# Patient Record
Sex: FEMALE | Race: WHITE | NOT HISPANIC OR LATINO | Employment: OTHER | URBAN - METROPOLITAN AREA
[De-identification: names, ages, dates, MRNs, and addresses within clinical notes are randomized per-mention and may not be internally consistent; named-entity substitution may affect disease eponyms.]

---

## 2024-05-15 ENCOUNTER — APPOINTMENT (OUTPATIENT)
Dept: CARDIOLOGY | Facility: HOSPITAL | Age: 64
End: 2024-05-15
Attending: INTERNAL MEDICINE
Payer: COMMERCIAL

## 2024-05-15 ENCOUNTER — APPOINTMENT (OUTPATIENT)
Dept: NUCLEAR MEDICINE | Facility: HOSPITAL | Age: 64
End: 2024-05-15
Attending: INTERNAL MEDICINE
Payer: COMMERCIAL

## 2024-05-15 ENCOUNTER — APPOINTMENT (OUTPATIENT)
Dept: RADIOLOGY | Facility: HOSPITAL | Age: 64
End: 2024-05-15
Attending: EMERGENCY MEDICINE
Payer: COMMERCIAL

## 2024-05-15 ENCOUNTER — HOSPITAL ENCOUNTER (OUTPATIENT)
Facility: HOSPITAL | Age: 64
Setting detail: OBSERVATION
Discharge: HOME | End: 2024-05-16
Attending: EMERGENCY MEDICINE | Admitting: INTERNAL MEDICINE
Payer: COMMERCIAL

## 2024-05-15 ENCOUNTER — APPOINTMENT (OUTPATIENT)
Dept: OCCUPATIONAL THERAPY | Facility: HOSPITAL | Age: 64
End: 2024-05-15
Attending: INTERNAL MEDICINE
Payer: COMMERCIAL

## 2024-05-15 DIAGNOSIS — R07.89 OTHER CHEST PAIN: ICD-10-CM

## 2024-05-15 DIAGNOSIS — I42.9 CARDIOMYOPATHY, UNSPECIFIED TYPE (H): ICD-10-CM

## 2024-05-15 DIAGNOSIS — R07.9 CHEST PAIN, UNSPECIFIED TYPE: ICD-10-CM

## 2024-05-15 DIAGNOSIS — R94.39 ABNORMAL CARDIOVASCULAR STRESS TEST: Primary | ICD-10-CM

## 2024-05-15 PROBLEM — E66.01 MORBID OBESITY (H): Status: ACTIVE | Noted: 2024-05-15

## 2024-05-15 PROBLEM — E10.9 DIABETES MELLITUS TYPE 1 (H): Status: ACTIVE | Noted: 2024-05-15

## 2024-05-15 LAB
ABO/RH(D): NORMAL
ANION GAP SERPL CALCULATED.3IONS-SCNC: 11 MMOL/L (ref 7–15)
ANTIBODY SCREEN: NEGATIVE
BASOPHILS # BLD AUTO: 0 10E3/UL (ref 0–0.2)
BASOPHILS NFR BLD AUTO: 0 %
BUN SERPL-MCNC: 19.6 MG/DL (ref 8–23)
CALCIUM SERPL-MCNC: 9.6 MG/DL (ref 8.8–10.2)
CHLORIDE SERPL-SCNC: 105 MMOL/L (ref 98–107)
CREAT SERPL-MCNC: 1.08 MG/DL (ref 0.51–0.95)
CV STRESS CURRENT BP HE: NORMAL
CV STRESS CURRENT HR HE: 63
CV STRESS CURRENT HR HE: 65
CV STRESS CURRENT HR HE: 67
CV STRESS CURRENT HR HE: 71
CV STRESS CURRENT HR HE: 72
CV STRESS CURRENT HR HE: 72
CV STRESS CURRENT HR HE: 73
CV STRESS CURRENT HR HE: 74
CV STRESS CURRENT HR HE: 75
CV STRESS CURRENT HR HE: 76
CV STRESS CURRENT HR HE: 79
CV STRESS CURRENT HR HE: 81
CV STRESS CURRENT HR HE: 86
CV STRESS DEVIATION TIME HE: NORMAL
CV STRESS ECHO PERCENT HR HE: NORMAL
CV STRESS EXERCISE STAGE HE: NORMAL
CV STRESS FINAL RESTING BP HE: NORMAL
CV STRESS FINAL RESTING HR HE: 71
CV STRESS MAX HR HE: 86
CV STRESS MAX TREADMILL GRADE HE: 0
CV STRESS MAX TREADMILL SPEED HE: 0
CV STRESS PEAK DIA BP HE: NORMAL
CV STRESS PEAK SYS BP HE: NORMAL
CV STRESS PHASE HE: NORMAL
CV STRESS PROTOCOL HE: NORMAL
CV STRESS RESTING PT POSITION HE: NORMAL
CV STRESS RESTING PT POSITION HE: NORMAL
CV STRESS ST DEVIATION AMOUNT HE: NORMAL
CV STRESS ST DEVIATION ELEVATION HE: NORMAL
CV STRESS ST EVELATION AMOUNT HE: NORMAL
CV STRESS TEST TYPE HE: NORMAL
CV STRESS TOTAL STAGE TIME MIN 1 HE: NORMAL
DEPRECATED HCO3 PLAS-SCNC: 24 MMOL/L (ref 22–29)
EGFRCR SERPLBLD CKD-EPI 2021: 57 ML/MIN/1.73M2
EOSINOPHIL # BLD AUTO: 0.2 10E3/UL (ref 0–0.7)
EOSINOPHIL NFR BLD AUTO: 2 %
ERYTHROCYTE [DISTWIDTH] IN BLOOD BY AUTOMATED COUNT: 14.4 % (ref 10–15)
GLUCOSE BLDC GLUCOMTR-MCNC: 100 MG/DL (ref 70–99)
GLUCOSE BLDC GLUCOMTR-MCNC: 116 MG/DL (ref 70–99)
GLUCOSE BLDC GLUCOMTR-MCNC: 122 MG/DL (ref 70–99)
GLUCOSE BLDC GLUCOMTR-MCNC: 127 MG/DL (ref 70–99)
GLUCOSE BLDC GLUCOMTR-MCNC: 147 MG/DL (ref 70–99)
GLUCOSE SERPL-MCNC: 151 MG/DL (ref 70–99)
HBA1C MFR BLD: 7.8 %
HCT VFR BLD AUTO: 41.6 % (ref 35–47)
HGB BLD-MCNC: 13.4 G/DL (ref 11.7–15.7)
IMM GRANULOCYTES # BLD: 0 10E3/UL
IMM GRANULOCYTES NFR BLD: 0 %
LYMPHOCYTES # BLD AUTO: 2.3 10E3/UL (ref 0.8–5.3)
LYMPHOCYTES NFR BLD AUTO: 33 %
MCH RBC QN AUTO: 29.4 PG (ref 26.5–33)
MCHC RBC AUTO-ENTMCNC: 32.2 G/DL (ref 31.5–36.5)
MCV RBC AUTO: 91 FL (ref 78–100)
MONOCYTES # BLD AUTO: 0.4 10E3/UL (ref 0–1.3)
MONOCYTES NFR BLD AUTO: 6 %
NEUTROPHILS # BLD AUTO: 4.1 10E3/UL (ref 1.6–8.3)
NEUTROPHILS NFR BLD AUTO: 59 %
NRBC # BLD AUTO: 0 10E3/UL
NRBC BLD AUTO-RTO: 0 /100
NT-PROBNP SERPL-MCNC: 160 PG/ML (ref 0–900)
PLATELET # BLD AUTO: 226 10E3/UL (ref 150–450)
POTASSIUM SERPL-SCNC: 4.1 MMOL/L (ref 3.4–5.3)
RATE PRESSURE PRODUCT: NORMAL
RBC # BLD AUTO: 4.56 10E6/UL (ref 3.8–5.2)
SODIUM SERPL-SCNC: 140 MMOL/L (ref 135–145)
SPECIMEN EXPIRATION DATE: NORMAL
STRESS ECHO BASELINE DIASTOLIC HE: 70
STRESS ECHO BASELINE HR: 62
STRESS ECHO BASELINE SYSTOLIC BP: 127
STRESS ECHO CALCULATED PERCENT HR: 55 %
STRESS ECHO LAST STRESS DIASTOLIC BP: 61
STRESS ECHO LAST STRESS HR: 76
STRESS ECHO LAST STRESS SYSTOLIC BP: 129
STRESS ECHO TARGET HR: 156
STRESS/REST PERFUSION RATIO: 1.32
TROPONIN T SERPL HS-MCNC: 6 NG/L
TROPONIN T SERPL HS-MCNC: 6 NG/L
TROPONIN T SERPL HS-MCNC: 7 NG/L
WBC # BLD AUTO: 7 10E3/UL (ref 4–11)

## 2024-05-15 PROCEDURE — 97535 SELF CARE MNGMENT TRAINING: CPT | Mod: GO

## 2024-05-15 PROCEDURE — 93306 TTE W/DOPPLER COMPLETE: CPT | Mod: 26 | Performed by: INTERNAL MEDICINE

## 2024-05-15 PROCEDURE — 96375 TX/PRO/DX INJ NEW DRUG ADDON: CPT

## 2024-05-15 PROCEDURE — 97165 OT EVAL LOW COMPLEX 30 MIN: CPT | Mod: GO

## 2024-05-15 PROCEDURE — 250N000011 HC RX IP 250 OP 636: Performed by: INTERNAL MEDICINE

## 2024-05-15 PROCEDURE — 343N000001 HC RX 343: Performed by: INTERNAL MEDICINE

## 2024-05-15 PROCEDURE — C8929 TTE W OR WO FOL WCON,DOPPLER: HCPCS

## 2024-05-15 PROCEDURE — 96376 TX/PRO/DX INJ SAME DRUG ADON: CPT

## 2024-05-15 PROCEDURE — 96372 THER/PROPH/DIAG INJ SC/IM: CPT | Mod: 59 | Performed by: INTERNAL MEDICINE

## 2024-05-15 PROCEDURE — G0378 HOSPITAL OBSERVATION PER HR: HCPCS

## 2024-05-15 PROCEDURE — 83880 ASSAY OF NATRIURETIC PEPTIDE: CPT | Performed by: EMERGENCY MEDICINE

## 2024-05-15 PROCEDURE — 78452 HT MUSCLE IMAGE SPECT MULT: CPT

## 2024-05-15 PROCEDURE — 255N000002 HC RX 255 OP 636: Performed by: INTERNAL MEDICINE

## 2024-05-15 PROCEDURE — 99285 EMERGENCY DEPT VISIT HI MDM: CPT | Mod: 25

## 2024-05-15 PROCEDURE — 36415 COLL VENOUS BLD VENIPUNCTURE: CPT | Performed by: INTERNAL MEDICINE

## 2024-05-15 PROCEDURE — 84484 ASSAY OF TROPONIN QUANT: CPT | Performed by: EMERGENCY MEDICINE

## 2024-05-15 PROCEDURE — 99222 1ST HOSP IP/OBS MODERATE 55: CPT | Performed by: INTERNAL MEDICINE

## 2024-05-15 PROCEDURE — 71045 X-RAY EXAM CHEST 1 VIEW: CPT

## 2024-05-15 PROCEDURE — 78452 HT MUSCLE IMAGE SPECT MULT: CPT | Mod: 26 | Performed by: STUDENT IN AN ORGANIZED HEALTH CARE EDUCATION/TRAINING PROGRAM

## 2024-05-15 PROCEDURE — 250N000012 HC RX MED GY IP 250 OP 636 PS 637: Performed by: INTERNAL MEDICINE

## 2024-05-15 PROCEDURE — 82962 GLUCOSE BLOOD TEST: CPT

## 2024-05-15 PROCEDURE — 93017 CV STRESS TEST TRACING ONLY: CPT

## 2024-05-15 PROCEDURE — 250N000013 HC RX MED GY IP 250 OP 250 PS 637: Performed by: INTERNAL MEDICINE

## 2024-05-15 PROCEDURE — 93005 ELECTROCARDIOGRAM TRACING: CPT | Mod: 59 | Performed by: EMERGENCY MEDICINE

## 2024-05-15 PROCEDURE — 83036 HEMOGLOBIN GLYCOSYLATED A1C: CPT | Performed by: INTERNAL MEDICINE

## 2024-05-15 PROCEDURE — 36415 COLL VENOUS BLD VENIPUNCTURE: CPT | Performed by: EMERGENCY MEDICINE

## 2024-05-15 PROCEDURE — 36415 COLL VENOUS BLD VENIPUNCTURE: CPT | Performed by: GENERAL ACUTE CARE HOSPITAL

## 2024-05-15 PROCEDURE — 84484 ASSAY OF TROPONIN QUANT: CPT | Performed by: INTERNAL MEDICINE

## 2024-05-15 PROCEDURE — 93018 CV STRESS TEST I&R ONLY: CPT | Performed by: STUDENT IN AN ORGANIZED HEALTH CARE EDUCATION/TRAINING PROGRAM

## 2024-05-15 PROCEDURE — 80048 BASIC METABOLIC PNL TOTAL CA: CPT | Performed by: EMERGENCY MEDICINE

## 2024-05-15 PROCEDURE — 86900 BLOOD TYPING SEROLOGIC ABO: CPT | Performed by: GENERAL ACUTE CARE HOSPITAL

## 2024-05-15 PROCEDURE — 99255 IP/OBS CONSLTJ NEW/EST HI 80: CPT | Performed by: GENERAL ACUTE CARE HOSPITAL

## 2024-05-15 PROCEDURE — 250N000011 HC RX IP 250 OP 636: Performed by: EMERGENCY MEDICINE

## 2024-05-15 PROCEDURE — 85025 COMPLETE CBC W/AUTO DIFF WBC: CPT | Performed by: EMERGENCY MEDICINE

## 2024-05-15 PROCEDURE — A9500 TC99M SESTAMIBI: HCPCS | Performed by: INTERNAL MEDICINE

## 2024-05-15 PROCEDURE — 96374 THER/PROPH/DIAG INJ IV PUSH: CPT

## 2024-05-15 PROCEDURE — 93016 CV STRESS TEST SUPVJ ONLY: CPT | Performed by: INTERNAL MEDICINE

## 2024-05-15 RX ORDER — SERTRALINE HYDROCHLORIDE 100 MG/1
100 TABLET, FILM COATED ORAL AT BEDTIME
Status: DISCONTINUED | OUTPATIENT
Start: 2024-05-15 | End: 2024-05-16 | Stop reason: HOSPADM

## 2024-05-15 RX ORDER — ENOXAPARIN SODIUM 100 MG/ML
40 INJECTION SUBCUTANEOUS EVERY 24 HOURS
Status: DISCONTINUED | OUTPATIENT
Start: 2024-05-15 | End: 2024-05-15 | Stop reason: DRUGHIGH

## 2024-05-15 RX ORDER — MORPHINE SULFATE 4 MG/ML
4 INJECTION, SOLUTION INTRAMUSCULAR; INTRAVENOUS
Status: DISCONTINUED | OUTPATIENT
Start: 2024-05-15 | End: 2024-05-15

## 2024-05-15 RX ORDER — PIOGLITAZONEHYDROCHLORIDE 15 MG/1
15 TABLET ORAL AT BEDTIME
COMMUNITY
Start: 2023-06-09

## 2024-05-15 RX ORDER — ALBUTEROL SULFATE 0.83 MG/ML
2.5 SOLUTION RESPIRATORY (INHALATION) EVERY 4 HOURS PRN
COMMUNITY
Start: 2024-02-12

## 2024-05-15 RX ORDER — ONDANSETRON 2 MG/ML
4 INJECTION INTRAMUSCULAR; INTRAVENOUS EVERY 6 HOURS PRN
Status: DISCONTINUED | OUTPATIENT
Start: 2024-05-15 | End: 2024-05-16 | Stop reason: HOSPADM

## 2024-05-15 RX ORDER — NICOTINE POLACRILEX 4 MG
15-30 LOZENGE BUCCAL
Status: DISCONTINUED | OUTPATIENT
Start: 2024-05-15 | End: 2024-05-15

## 2024-05-15 RX ORDER — ACETAMINOPHEN 650 MG/1
650 SUPPOSITORY RECTAL EVERY 4 HOURS PRN
Status: DISCONTINUED | OUTPATIENT
Start: 2024-05-15 | End: 2024-05-16 | Stop reason: HOSPADM

## 2024-05-15 RX ORDER — DEXTROSE MONOHYDRATE 25 G/50ML
25-50 INJECTION, SOLUTION INTRAVENOUS
Status: DISCONTINUED | OUTPATIENT
Start: 2024-05-15 | End: 2024-05-15

## 2024-05-15 RX ORDER — REGADENOSON 0.08 MG/ML
0.4 INJECTION, SOLUTION INTRAVENOUS ONCE
Status: COMPLETED | OUTPATIENT
Start: 2024-05-15 | End: 2024-05-15

## 2024-05-15 RX ORDER — NITROGLYCERIN 0.4 MG/1
0.4 TABLET SUBLINGUAL EVERY 5 MIN PRN
Status: DISCONTINUED | OUTPATIENT
Start: 2024-05-15 | End: 2024-05-15

## 2024-05-15 RX ORDER — DIAZEPAM 5 MG
5 TABLET ORAL
Status: COMPLETED | OUTPATIENT
Start: 2024-05-15 | End: 2024-05-16

## 2024-05-15 RX ORDER — ATORVASTATIN CALCIUM 40 MG/1
80 TABLET, FILM COATED ORAL AT BEDTIME
Status: DISCONTINUED | OUTPATIENT
Start: 2024-05-15 | End: 2024-05-16 | Stop reason: HOSPADM

## 2024-05-15 RX ORDER — MONTELUKAST SODIUM 10 MG/1
10 TABLET ORAL AT BEDTIME
Status: DISCONTINUED | OUTPATIENT
Start: 2024-05-15 | End: 2024-05-16 | Stop reason: HOSPADM

## 2024-05-15 RX ORDER — ACETAMINOPHEN 325 MG/1
650 TABLET ORAL EVERY 4 HOURS PRN
Status: DISCONTINUED | OUTPATIENT
Start: 2024-05-15 | End: 2024-05-16 | Stop reason: HOSPADM

## 2024-05-15 RX ORDER — MORPHINE SULFATE 4 MG/ML
4 INJECTION, SOLUTION INTRAMUSCULAR; INTRAVENOUS EVERY 30 MIN PRN
Status: DISCONTINUED | OUTPATIENT
Start: 2024-05-15 | End: 2024-05-15

## 2024-05-15 RX ORDER — SEMAGLUTIDE 0.25 MG/.5ML
0.25 INJECTION, SOLUTION SUBCUTANEOUS
COMMUNITY
Start: 2024-04-17 | End: 2024-07-02 | Stop reason: DRUGHIGH

## 2024-05-15 RX ORDER — ALBUTEROL SULFATE 0.83 MG/ML
2.5 SOLUTION RESPIRATORY (INHALATION)
Status: DISCONTINUED | OUTPATIENT
Start: 2024-05-15 | End: 2024-05-15

## 2024-05-15 RX ORDER — CAFFEINE 200 MG
200 TABLET ORAL
Status: DISCONTINUED | OUTPATIENT
Start: 2024-05-15 | End: 2024-05-15

## 2024-05-15 RX ORDER — FLUTICASONE PROPIONATE 50 MCG
2 SPRAY, SUSPENSION (ML) NASAL DAILY PRN
COMMUNITY
Start: 2023-06-09

## 2024-05-15 RX ORDER — FLUTICASONE PROPIONATE 50 MCG
2 SPRAY, SUSPENSION (ML) NASAL DAILY PRN
Status: DISCONTINUED | OUTPATIENT
Start: 2024-05-15 | End: 2024-05-16 | Stop reason: HOSPADM

## 2024-05-15 RX ORDER — NITROGLYCERIN 0.4 MG/1
0.4 TABLET SUBLINGUAL EVERY 5 MIN PRN
Status: DISCONTINUED | OUTPATIENT
Start: 2024-05-15 | End: 2024-05-16 | Stop reason: HOSPADM

## 2024-05-15 RX ORDER — CAFFEINE CITRATE 20 MG/ML
60 SOLUTION INTRAVENOUS
Status: DISCONTINUED | OUTPATIENT
Start: 2024-05-15 | End: 2024-05-15

## 2024-05-15 RX ORDER — AMINOPHYLLINE 25 MG/ML
50 INJECTION, SOLUTION INTRAVENOUS
Status: DISCONTINUED | OUTPATIENT
Start: 2024-05-15 | End: 2024-05-15

## 2024-05-15 RX ORDER — MONTELUKAST SODIUM 10 MG/1
10 TABLET ORAL AT BEDTIME
COMMUNITY
Start: 2023-06-09

## 2024-05-15 RX ORDER — ASPIRIN 81 MG/1
81 TABLET ORAL DAILY
Status: DISCONTINUED | OUTPATIENT
Start: 2024-05-16 | End: 2024-05-16 | Stop reason: HOSPADM

## 2024-05-15 RX ORDER — NICOTINE POLACRILEX 4 MG
15-30 LOZENGE BUCCAL
Status: DISCONTINUED | OUTPATIENT
Start: 2024-05-15 | End: 2024-05-16 | Stop reason: HOSPADM

## 2024-05-15 RX ORDER — ALBUTEROL SULFATE 0.83 MG/ML
2.5 SOLUTION RESPIRATORY (INHALATION) EVERY 4 HOURS PRN
Status: DISCONTINUED | OUTPATIENT
Start: 2024-05-15 | End: 2024-05-16 | Stop reason: HOSPADM

## 2024-05-15 RX ORDER — MAGNESIUM HYDROXIDE/ALUMINUM HYDROXICE/SIMETHICONE 120; 1200; 1200 MG/30ML; MG/30ML; MG/30ML
30 SUSPENSION ORAL EVERY 4 HOURS PRN
Status: DISCONTINUED | OUTPATIENT
Start: 2024-05-15 | End: 2024-05-16 | Stop reason: HOSPADM

## 2024-05-15 RX ORDER — DEXTROSE MONOHYDRATE 25 G/50ML
25-50 INJECTION, SOLUTION INTRAVENOUS
Status: DISCONTINUED | OUTPATIENT
Start: 2024-05-15 | End: 2024-05-16 | Stop reason: HOSPADM

## 2024-05-15 RX ORDER — SERTRALINE HYDROCHLORIDE 100 MG/1
100 TABLET, FILM COATED ORAL DAILY
COMMUNITY
Start: 2024-02-14

## 2024-05-15 RX ORDER — ENOXAPARIN SODIUM 100 MG/ML
40 INJECTION SUBCUTANEOUS EVERY 12 HOURS
Status: DISCONTINUED | OUTPATIENT
Start: 2024-05-15 | End: 2024-05-16 | Stop reason: HOSPADM

## 2024-05-15 RX ORDER — ONDANSETRON 4 MG/1
4 TABLET, ORALLY DISINTEGRATING ORAL EVERY 6 HOURS PRN
Status: DISCONTINUED | OUTPATIENT
Start: 2024-05-15 | End: 2024-05-16 | Stop reason: HOSPADM

## 2024-05-15 RX ORDER — ATORVASTATIN CALCIUM 80 MG/1
1 TABLET, FILM COATED ORAL AT BEDTIME
COMMUNITY
Start: 2023-06-09 | End: 2024-08-05 | Stop reason: SINTOL

## 2024-05-15 RX ORDER — ONDANSETRON 2 MG/ML
4 INJECTION INTRAMUSCULAR; INTRAVENOUS ONCE
Status: COMPLETED | OUTPATIENT
Start: 2024-05-15 | End: 2024-05-15

## 2024-05-15 RX ADMIN — ENOXAPARIN SODIUM 40 MG: 40 INJECTION SUBCUTANEOUS at 21:49

## 2024-05-15 RX ADMIN — PERFLUTREN 2 ML: 6.52 INJECTION, SUSPENSION INTRAVENOUS at 09:10

## 2024-05-15 RX ADMIN — MORPHINE SULFATE 4 MG: 4 INJECTION, SOLUTION INTRAMUSCULAR; INTRAVENOUS at 08:39

## 2024-05-15 RX ADMIN — ACETAMINOPHEN 650 MG: 325 TABLET ORAL at 06:45

## 2024-05-15 RX ADMIN — INSULIN GLARGINE 20 UNITS: 100 INJECTION, SOLUTION SUBCUTANEOUS at 21:42

## 2024-05-15 RX ADMIN — Medication 38.2 MILLICURIE: at 09:57

## 2024-05-15 RX ADMIN — Medication 8.3 MILLICURIE: at 07:13

## 2024-05-15 RX ADMIN — ATORVASTATIN CALCIUM 80 MG: 40 TABLET, FILM COATED ORAL at 21:49

## 2024-05-15 RX ADMIN — ACETAMINOPHEN 650 MG: 325 TABLET ORAL at 16:27

## 2024-05-15 RX ADMIN — REGADENOSON 0.4 MG: 0.08 INJECTION, SOLUTION INTRAVENOUS at 09:40

## 2024-05-15 RX ADMIN — MORPHINE SULFATE 4 MG: 4 INJECTION, SOLUTION INTRAMUSCULAR; INTRAVENOUS at 02:00

## 2024-05-15 RX ADMIN — ENOXAPARIN SODIUM 40 MG: 40 INJECTION SUBCUTANEOUS at 08:35

## 2024-05-15 RX ADMIN — SERTRALINE HYDROCHLORIDE 100 MG: 100 TABLET ORAL at 21:44

## 2024-05-15 RX ADMIN — MONTELUKAST 10 MG: 10 TABLET, FILM COATED ORAL at 21:44

## 2024-05-15 RX ADMIN — ONDANSETRON 4 MG: 2 INJECTION INTRAMUSCULAR; INTRAVENOUS at 01:59

## 2024-05-15 RX ADMIN — AMINOPHYLLINE 50 MG: 25 INJECTION, SOLUTION INTRAVENOUS at 09:44

## 2024-05-15 ASSESSMENT — ACTIVITIES OF DAILY LIVING (ADL)
ADLS_ACUITY_SCORE: 36
ADLS_ACUITY_SCORE: 36
ADLS_ACUITY_SCORE: 35
ADLS_ACUITY_SCORE: 35
ADLS_ACUITY_SCORE: 36
ADLS_ACUITY_SCORE: 36
ADLS_ACUITY_SCORE: 35
ADLS_ACUITY_SCORE: 35
ADLS_ACUITY_SCORE: 36
ADLS_ACUITY_SCORE: 36
ADLS_ACUITY_SCORE: 22
IADL_COMMENTS: IND. W/ IADL ROUTINE
ADLS_ACUITY_SCORE: 35
ADLS_ACUITY_SCORE: 36
ADLS_ACUITY_SCORE: 35
ADLS_ACUITY_SCORE: 35
ADLS_ACUITY_SCORE: 36
DEPENDENT_IADLS:: INDEPENDENT
ADLS_ACUITY_SCORE: 35
ADLS_ACUITY_SCORE: 35

## 2024-05-15 NOTE — PLAN OF CARE
Problem: Adult Inpatient Plan of Care  Goal: Plan of Care Review  Description: The Plan of Care Review/Shift note should be completed every shift.  The Outcome Evaluation is a brief statement about your assessment that the patient is improving, declining, or no change.  This information will be displayed automatically on your shift  note.  Outcome: Progressing  Flowsheets (Taken 5/15/2024 4245)  Plan of Care Reviewed With:   patient   spouse   Goal Outcome Evaluation:      Plan of Care Reviewed With: patient, spouse  Pt alert and oriented x 4, up with assist of one for safety. Pt complaining  left chest pressure radiating to left arm, PRN Morphine was given once with moderate relief. NM stress test and ECHO completed. Cardiology has been consulted. Will continue to monitor.

## 2024-05-15 NOTE — CONSULTS
"     We have been asked to see Iman Martinez at [facility] by Dr. Noe Blackmon for evaluation of chest pain.    Impression and Plan     Assessment:  Chest pain. Her symptoms are somewhat atypical and it would be unusual for hours of constant angina to not cause a troponin elevation or electrocardiographic changes. Nonetheless, she has risk factors for coronary artery disease as well as an abnormal stress test and left ventricular systolic dysfunction   Abnormal pharmacologic nuclear stress test 5/15/2024 suggesting left anterior descending artery territory ischemia.  Cardiomyopathy with left ventricular ejection fraction 45-50%. Unclear etiology but possibly ischemia. She does not appear hypervolemic.  Questionable cerebrovascular accident 3/8/2019 with residual left lower extremity weakness but normal neuroimaging.  Hyperlipidemia. Last  mg/dL.  Insulin-dependent diabetes mellitus type 2. Last hemoglobin A1c 8.1%.  Obstructive sleep apnea not currently using CPAP.  Morbid obesity with body mass index 47.83 kg/m .    Plan:  Proceed with coronary angiography tomorrow 5/16/2024. Explained the risks and benefits to the patient. She demonstrates understanding and wishes to proceed.  Aspirin 81 mg daily  Atorvastatin 80 mg daily.  If her blood pressure allows, we may be able to add an angiotensin-converting enzyme inhibitor or angiotension receptor blocker to treat her cardiomyopathy    Primary Cardiologist: none. Can establish with me if needed    Clinically Significant Risk Factors Present on Admission                       # Severe Obesity: Estimated body mass index is 47.83 kg/m  as calculated from the following:    Height as of this encounter: 1.6 m (5' 3\").    Weight as of this encounter: 122.5 kg (270 lb).             History of Present Illness      Ms. Iman Martinez is a 64 year old female with 57 year old female with a past medical history of obesity, probable CVA, hyperlipidemia, " "diabetes who presented to the emergency room overnight for acute onset chest pain that started at 11-11:30 PM last night while she was getting ready to go to bed. She reports associated symptoms including diaphoresis, nausea, and shortness of breath, which have since resolved. The chest pain felt slightly improved following nitroglycerin; however, it has remained persistent and is currently about 3/10 on the pain scale. She describes it as pressure in the left-chest with radiation to her left arm and a tight sensation in her neck, \"like I'm wearing a turtleneck.\"      Aside from this episode, she has not had any prior chest pain or shortness of breath. For exercise, she typically walks around during the day and would be able to climb a flight of stairs, aside from some difficulty due to residual left leg weakness. Ms. Martinez is a former smoker and quit 20-30 years ago. Her father passed away from a heart attack in his 60s.      In the emergency department, EKG showed sinus rhythm with no acute ST segment changes. Her troponin and BNP were normal. Other lab work including CBC and CMP were also unremarkable, aside from elevated blood glucose. Last oral intake was yesterday. She has only had sips of water today. Chest XR was negative. Echocardiogram showed mildly decreased LV function with an EF of 45-50% and trace aortic insufficiency. Nuclear stress test showed a medium sized area of moderate ischemia in the entire anterior and apical segments of the left ventricle, appearing in the LAD distribution.     No light headedness/dizziness, pre-syncope, syncope, lower extremity swelling, palpitations, paroxysmal nocturnal dyspnea (PND), or orthopnea.    She was hospitalized at [facility] 3/8/2024 - 3/10/2024 with left-sided weakness suspected to be a stroke. However, neuroimaging did not show any evidence of stroke and neurology questioned whether this was truly a stroke. Nonetheless she still " have persistent left lower extremity weakness.    Review of Systems:  Further review of systems is otherwise negative/noncontributory (based on review of medical record (admission H&P) and 13 point review of systems reviewed. Pertinent positives noted).    Cardiac Diagnostics     ECG 5/15/2024 (personally reviewed and interpreted): SR, normal ECG    Telemetry (personally reviewed): SR, normal ECG    Echocardiogram 5/15/2024 (results reviewed):   1. Technically difficult study.  2. The left ventricle is normal in size. Left ventricular systolic performance is mildly reduced. The ejection fraction is estimated to be 45-50%.  3. There is mild global reduction in left ventricular systolic performance.  4. There is trace aortic insufficiency.  5. Probable normal right ventricular size and systolic performance though right-sided structures are not clearly visualized on all views on this study.    Cardiac Stress Testing 5/15/2024 (results reviewed):     The nuclear stress test is abnormal.    There is a medium sized area of moderate ischemia in the entire anterior and apical segment(s) of the left ventricle. This appears to be in the left anterior descending distribution.    Stress to rest cavity ratio (TID) is abnormal at 1.32.    The stress electrocardiogram is negative for inducible ischemic EKG changes.    The left ventricular ejection fraction at stress is greater than 70%.    The patient is at an intermediate risk of future cardiac ischemic events.    There is no prior study for comparison.    Medical History  Surgical History Family History Social History   Medical History  Medical History Date Comments   Overweight(278.02)       Allergic rhinitis, cause unspecified       Obstructive sleep apnea (adult) (pediatric)       Depressive disorder, not elsewhere classified       Carpal tunnel syndrome   right   Trigger finger (acquired)   right long   Hamstring muscle strain       YURI (obstructive sleep apnea) 12/7/2008  Doesn't tolerate CPAP   Symptomatic menopausal or female climacteric states       Anxiety state, unspecified       Female stress incontinence       Other postprocedural status(V45.89)       Type II or unspecified type diabetes mellitus without mention of complication, not stated as uncontrolled       Esophageal reflux       Other and unspecified hyperlipidemia       Other specified disorder of gallbladder       Need for prophylactic hormone replacement therapy (postmenopausal)       Arthritis        Surgical History  Surgery Date Site/Laterality Comments   HYSTERECTOMY 1991 - 1991   bialteral Salingoophorectomy    APPENDECTOMY 1975 - 1975       left ovarian cyst 1979 - 1979       medium nerve surgery 1991 - 1991       CARPAL TUNNEL RELEASE 1993 - 1993       SHOULDER ARTHROSCOPY 1994 - 1994   left    CARPAL TUNNEL RELEASE 58-07   Right endoscopic    hx trigger finger release 8-07   Right long     SHOULDER ARTHROSCOPY 2011 - 2011   Right    BIOPSY BREAST 2006 - 2006 Left benign    CHOLECYSTECTOMY         COLONOSCOPY SCREENING ~       COLONOSCOPY DIAGNOSTIC 2017   Min. tics, random bx's neg., hyperplastic polyp, repeat 10 yrs.     Medical History Relation Name Comments   Heart Disease Father   MI   Cancer-breast Maternal Aunt   age 60's   Cancer-breast Maternal Grandmother   age 50's   Cancer-breast Mother       Dementia Mother       Cancer-ovarian No Family History         Family History  Relation Name Status Comments   Father    cad   Maternal Aunt         Maternal Grandmother    breast cancer   Mother    no contact        Social History     Socioeconomic History    Marital status:      Spouse name: Not on file    Number of children: Not on file    Years of education: Not on file    Highest education level: Not on file   Occupational History    Not on file   Tobacco Use    Smoking status: Some  "Days    Smokeless tobacco: Not on file   Substance and Sexual Activity    Alcohol use: No    Drug use: No    Sexual activity: Not on file   Other Topics Concern    Not on file   Social History Narrative    Not on file     Social Determinants of Health     Financial Resource Strain: Low Risk  (6/9/2023)    [facility]    Financial Resource Strain     Difficulty of Paying Living Expenses: 3     Difficulty of Paying Living Expenses: Not on file   Food Insecurity: No Food Insecurity (6/9/2023)    Received from [facility]    Food Insecurity     Worried About Running Out of Food in the Last Year: 1   Transportation Needs: No Transportation Needs (6/9/2023)    Received from [facility]    Transportation Needs     Lack of Transportation (Medical): 1   Physical Activity: Not on file   Stress: Not on file   Social Connections: Socially Integrated (6/9/2023)    Received from [facility]    Social Connections     Frequency of Communication with Friends and Family: 0   Interpersonal Safety: Not on file   Housing Stability: Low Risk  (6/9/2023)    Received from [facility]    Housing Stability     Unable to Pay for Housing in the Last Year: 1             Physical Examination   VITALS: /61   Pulse 66   Temp 98  F (36.7  C) (Oral)   Resp 19   Ht 1.6 m (5' 3\")   Wt 122.5 kg (270 lb)   SpO2 93%   BMI 47.83 kg/m    BMI: Body mass index is 47.83 kg/m .  Wt Readings from Last 3 Encounters:   05/15/24 122.5 kg (270 lb)   09/23/16 113.4 kg (250 lb)       General Appearance:  Alert, cooperative, no distress, appears stated age    Head:  Normocephalic, without obvious abnormality, atraumatic   Eyes:  PERRL, conjunctiva/corneas clear, EOM's intact   Ears:  Normal external ear canals bilaterally   Nose: Nares normal, septum midline, no drainage "   Throat: Lips, mucosa, and tongue normal; teeth and gums normal   Neck: Supple, symmetrical, trachea midline, no adenopathy, no carotid bruit or JVD    Back:   Symmetric, no abnormal curvature, ROM normal   Lungs:   Clear to auscultation bilaterally, respirations unlabored   Chest Wall:  No tenderness or deformity   Heart:  Regular rate and rhythm , S1, S2 normal,no murmur, rub or gallop   Abdomen:   Soft, non-tender, bowel sounds active all four quadrants,  no masses, no organomegaly   Extremities: Extremities normal, atraumatic, no cyanosis or edema   Skin: Skin color, texture, turgor normal, no rashes or lesions   Psychiatric: Normal affect, pleasant and cooperative   Neurologic: Alert and oriented X 3, Moves all 4 extremities            Imaging      CXR 5/15/2024 (report reviewed):  Negative chest.   Multiple rotator cuff suture anchor is again seen in the right humeral head.     CT head/neck 3/8/2019 (report reviewed):  Normal CT angiogram of the head and neck.     MRI brain 3/8/2019 (report reviewed):  1. No acute intracranial abnormality, including no evidence of acute infarction.   2. Stable mild chronic microvascular ischemic changes within the supratentorial white matter.      Lab Results    Chemistry/lipid CBC Cardiac Enzymes/BNP/TSH/INR     Recent Labs   Lab Test 05/15/24  1216 05/15/24  0639 05/15/24  0156   NA  --   --  140   POTASSIUM  --   --  4.1   CHLORIDE  --   --  105   CO2  --   --  24   *   < > 151*   BUN  --   --  19.6   CR  --   --  1.08*   GFRESTIMATED  --   --  57*   MARCUS  --   --  9.6    < > = values in this interval not displayed.     Recent Labs   Lab Test 05/15/24  0156 09/23/16  0940   CR 1.08* 0.58        Recent Labs   Lab Test 05/15/24  0156   WBC 7.0   HGB 13.4   HCT 41.6   MCV 91        Recent Labs   Lab Test 05/15/24  0156 09/23/16  0940   HGB 13.4 13.1      Recent Labs   Lab Test 05/15/24  0156 09/23/16  0940   NTBNPI 160 102       Recent Labs   Lab Test  09/23/16  0940   INR 1.09           Current Inpatient Scheduled Medications   Scheduled Meds:  Current Facility-Administered Medications   Medication Dose Route Frequency Provider Last Rate Last Admin    [START ON 5/16/2024] aspirin EC tablet 81 mg  81 mg Oral Daily Noe Blackmon MD        atorvastatin (LIPITOR) tablet 80 mg  80 mg Oral At Bedtime Noe Blackmon MD        [Held by provider] empagliflozin (JARDIANCE) tablet 25 mg  25 mg Oral At Bedtime Noe Blackmon MD        enoxaparin ANTICOAGULANT (LOVENOX) injection 40 mg  40 mg Subcutaneous Q12H Noe Blackmon MD   40 mg at 05/15/24 0835    insulin aspart (NovoLOG) injection (RAPID ACTING)  1-4 Units Subcutaneous Q4H Noe Blackmon MD        insulin glargine (LANTUS PEN) injection 33 Units  33 Units Subcutaneous At Bedtime Noe Blackmon MD        montelukast (SINGULAIR) tablet 10 mg  10 mg Oral At Bedtime Noe Blackmon MD        sertraline (ZOLOFT) tablet 100 mg  100 mg Oral At Bedtime Noe Blackmon MD           Current Outpatient Medications   Medication Sig Dispense Refill    albuterol (PROVENTIL) (2.5 MG/3ML) 0.083% neb solution Inhale 2.5 mg into the lungs every 4 hours as needed for shortness of breath      atorvastatin (LIPITOR) 80 MG tablet Take 1 tablet by mouth at bedtime      diclofenac (VOLTAREN) 1 % topical gel Apply 4 g topically 4 times daily as needed for moderate pain      empagliflozin (JARDIANCE) 25 MG TABS tablet Take 25 mg by mouth at bedtime      fluticasone (FLONASE) 50 MCG/ACT nasal spray Spray 2 sprays into both nostrils daily as needed for allergies      insulin glargine (LANTUS) 100 UNIT/ML PEN Inject 33 Units Subcutaneous at bedtime      montelukast (SINGULAIR) 10 MG tablet Take 10 mg by mouth at bedtime      pioglitazone (ACTOS) 15 MG tablet Take 15 mg by mouth at bedtime      sertraline (ZOLOFT) 100 MG tablet Take 100 mg by mouth at bedtime      WEGOVY 0.25 MG/0.5ML  pen Inject 0.25 mg Subcutaneous every 7 days            Medications Prior to Admission   Prior to Admission medications    Medication Sig Start Date End Date Taking? Authorizing Provider   albuterol (PROVENTIL) (2.5 MG/3ML) 0.083% neb solution Inhale 2.5 mg into the lungs every 4 hours as needed for shortness of breath 2/12/24  Yes Reported, Patient   atorvastatin (LIPITOR) 80 MG tablet Take 1 tablet by mouth at bedtime 6/9/23  Yes Reported, Patient   diclofenac (VOLTAREN) 1 % topical gel Apply 4 g topically 4 times daily as needed for moderate pain 1/17/24  Yes Reported, Patient   empagliflozin (JARDIANCE) 25 MG TABS tablet Take 25 mg by mouth at bedtime 6/9/23  Yes Reported, Patient   fluticasone (FLONASE) 50 MCG/ACT nasal spray Spray 2 sprays into both nostrils daily as needed for allergies 6/9/23  Yes Reported, Patient   insulin glargine (LANTUS) 100 UNIT/ML PEN Inject 33 Units Subcutaneous at bedtime   Yes Reported, Patient   montelukast (SINGULAIR) 10 MG tablet Take 10 mg by mouth at bedtime 6/9/23  Yes Reported, Patient   pioglitazone (ACTOS) 15 MG tablet Take 15 mg by mouth at bedtime 6/9/23  Yes Reported, Patient   sertraline (ZOLOFT) 100 MG tablet Take 100 mg by mouth at bedtime 2/14/24  Yes Reported, Patient   WEGOVY 0.25 MG/0.5ML pen Inject 0.25 mg Subcutaneous every 7 days 4/17/24  Yes Reported, Patient          Sid Ham MD [facility]  Non-Invasive Cardiologist  [facility]  Pager [phone]

## 2024-05-15 NOTE — ED NOTES
Bed: [place] [address]  Expected date: 5/15/24  Expected time: 12:41 AM  Means of arrival: Ambulance  Comments:  [facility]  64 F--CP down left arm, negative EKG, VSS, relief with nitro

## 2024-05-15 NOTE — PROGRESS NOTES
Nuclear stress test with regadenoson 0.4 mg IV,vasodilator side effects required aminophylline dose for reversal, asymptomatic at end of  stress,images pending.

## 2024-05-15 NOTE — CONSULTS
Care Management Initial Consult    General Information  Assessment completed with: Iman Shipman  Type of CM/SW Visit: Initial Assessment    Primary Care Provider verified and updated as needed: Yes (Dr. Burton)   Readmission within the last 30 days: no previous admission in last 30 days      Reason for Consult: discharge planning  Advance Care Planning:       General Information Comments: No CM needs    Communication Assessment  Patient's communication style: spoken language (English or Bilingual)             Cognitive  Cognitive/Neuro/Behavioral: WDL                      Living Environment:   People in home: spouse  Master  Current living Arrangements: apartment      Able to return to prior arrangements: yes       Family/Social Support:  Care provided by: self  Provides care for: no one  Marital Status:             Description of Support System: Supportive         Current Resources:   Patient receiving home care services: No     Community Resources: None  Equipment currently used at home: none  Supplies currently used at home:      Employment/Financial:  Employment Status:          Financial Concerns:             Does the patient's insurance plan have a 3 day qualifying hospital stay waiver?  No    Lifestyle & Psychosocial Needs:  Social Determinants of Health     Food Insecurity: No Food Insecurity (6/9/2023)    Received from [employer] [facility]    Food Insecurity     Worried About Running Out of Food in the Last Year: 1   Depression: Not at risk (9/13/2023)    Received from [facility]    PHQ-2     PHQ-2 TOTAL SCORE: 0   Housing Stability: Low Risk  (6/9/2023)    Received from [employer] [facility]    Housing Stability     Unable to Pay for Housing in the Last Year: 1   Tobacco Use: Medium Risk (4/22/2024)    Received from [facility]    Patient History     Smoking Tobacco Use: Former      Smokeless Tobacco Use: Never     Passive Exposure: Not on file   Financial Resource Strain: Low Risk  (6/9/2023)    Received from [facility]    Financial Resource Strain     Difficulty of Paying Living Expenses: 3     Difficulty of Paying Living Expenses: Not on file   Alcohol Use: Not on file   Transportation Needs: No Transportation Needs (6/9/2023)    [facility] from [facility]    Transportation Needs     Lack of Transportation (Medical): 1   Physical Activity: Not on file   Interpersonal Safety: Not on file   Stress: Not on file   Social Connections: Socially Integrated (6/9/2023)    Received from [facility] [facility]    Social Connections     Frequency of Communication with Friends and Family: 0   Health Literacy: Not on file       Functional Status:  Prior to admission patient needed assistance:   Dependent ADLs:: Independent  Dependent IADLs:: Independent       Mental Health Status:  Mental Health Status: No Current Concerns       Chemical Dependency Status:  Chemical Dependency Status: No Current Concerns             Values/Beliefs:  Spiritual, Cultural Beliefs, Advent Practices, Values that affect care:            Values/Beliefs Comment: Christal    Additional Information:  Writer met with patient to review role of care management services, discuss goals of care and assess need for any possible services at discharge. Patient alert, answering questions appropriately and engaged in the conversation.  Patient is  and independent with all activities of daily living at baseline.   Patient is independent with activities of daily living at baseline.  No care management needs identified at this time but CM will continue to monitor progression of care, review team recommendations and provide discharge planning assist as needed.      Eliana Acosta RN

## 2024-05-15 NOTE — ED TRIAGE NOTES
Chest pain started ~2300 10/10 with pain radiating down Left arm. ASA 325mg taken at home, NTG x2 given by EMS with pain down to 7/10. Hx DM1 b, CVA

## 2024-05-15 NOTE — PROGRESS NOTES
Arrived for nuclear pharmacological stress test, stated she had caffeine at 2100 last night. Returned to room, will bring back at 0930 for stress injection, after the 12 hour no caffeine window expires.

## 2024-05-15 NOTE — H&P
[facility]    History and Physical - Hospitalist Service       Date of Admission:  5/15/2024    Assessment & Plan   Iman Martinez is a 64 year old female with history of morbid obesity, hyperlipidemia, depression/anxiety disorder, and type 1 diabetes admitted on 5/15/2024 with unstable angina.    EKG showed normal sinus rhythm.  Echocardiogram demonstrated mildly reduced LV systolic function with LVEF of 45 to 50%.  Report of Lexiscan stress test is pending.    Suspected unstable angina  Persistent left-sided chest pain radiating to the left upper extremity  Echocardiogram demonstrated mildly reduced LV systolic function with LVEF of 45 to 50%.  Report of Lexiscan stress test is pending    Aspirin 81 mg daily  Atorvastatin 80 mg at bedtime  Telemetry  Sublingual nitroglycerin as needed  Oxycodone as needed  Follow cardiology consult    Type 1 diabetes  Hold PTA empagliflozin  Insulin sliding scale  Lantus 33 units at bedtime  Monitor for hypoglycemia and correct per protocol    Hyperlipidemia  Atorvastatin 80 mg at bedtime      Depression/anxiety disorder  Sertraline 100 mg at bedtime      Observation Goals: List all goals to be met before discharge home: , - Serial troponins and stress test complete., - Seen and cleared by consultant if applicable, - Adequate pain control on oral analgesia, - Vital signs normal or at patient baseline, - Safe disposition plan has been identified, - Nurse to notify provider when observation goals have been met and patient is ready for discharge.  Diet: NPO for Medical/Clinical Reasons Except for: Ice Chips, Meds    DVT Prophylaxis: Enoxaparin (Lovenox) SQ  Hanks Catheter: Not present  Lines: None     Cardiac Monitoring: ACTIVE order. Indication: Chest pain/ ACS rule out (24 hours)  Code Status: Full Code      Clinically Significant Risk Factors Present on Admission                       # Severe Obesity: Estimated body mass index is 47.83 kg/m  as  "calculated from the following:    Height as of this encounter: 1.6 m (5' 3\").    Weight as of this encounter: 122.5 kg (270 lb).              Disposition Plan     Medically Ready for Discharge: Anticipated in 2-4 Days    Noe Blackmon MD  Hospitalist Service  [facility]  Securely message with [employer] (more info)  Text page via [employer] Paging/Directory     ______________________________________________________________________    Chief Complaint   Chest pain    History is obtained from the patient    History of Present Illness   Iman Martinez is a 64 year old female with history of morbid obesity, hyperlipidemia, depression/anxiety disorder, and type 1 diabetes who presents to the ER with chest pain.    She was in her usual state of health until yesterday when she developed left-sided chest pain described as sensation of pressure and radiating to the left arm.  She states chest pain has subsided.  She also endorsed dizziness with ambulation, however, she denies shortness of breath or palpitation.  She is scheduled for stress test this morning     Initial blood pressure was 123/71, pulse 76, respiratory rate 25, temperature 99.4  F and oxygen saturation of 96% on room air. Notable laboratory findings include creatinine 1.08, initial high-sensitivity troponin 6 with a repeat of 7, and normal CBC.  EKG showed normal sinus rhythm.  Echocardiogram demonstrated mildly reduced LV systolic function with LVEF of 45 to 50%.  Report of Lexiscan stress test is pending.    She received acetaminophen, morphine, and Zofran in the ER.      Past Medical History    Past Medical History:   Diagnosis Date    Anxiety     Diabetes (H)        Past Surgical History   Past Surgical History:   Procedure Laterality Date    CHOLECYSTECTOMY      GYN SURGERY      ORTHOPEDIC SURGERY         Prior to Admission Medications   Prior to Admission Medications   Prescriptions Last Dose Informant Patient Reported? Taking? "   WEGOVY 0.25 MG/0.5ML pen 5/10/2024 at am Self Yes Yes   Sig: Inject 0.25 mg Subcutaneous every 7 days   albuterol (PROVENTIL) (2.5 MG/3ML) 0.083% neb solution Unknown at prn Self Yes Yes   Sig: Inhale 2.5 mg into the lungs every 4 hours as needed for shortness of breath   atorvastatin (LIPITOR) 80 MG tablet 5/14/2024 at hs Self Yes Yes   Sig: Take 1 tablet by mouth at bedtime   diclofenac (VOLTAREN) 1 % topical gel Unknown at prn Self Yes Yes   Sig: Apply 4 g topically 4 times daily as needed for moderate pain   empagliflozin (JARDIANCE) 25 MG TABS tablet 5/14/2024 at hs Self Yes Yes   Sig: Take 25 mg by mouth at bedtime   fluticasone (FLONASE) 50 MCG/ACT nasal spray Unknown at prn Self Yes Yes   Sig: Spray 2 sprays into both nostrils daily as needed for allergies   insulin glargine (LANTUS) 100 UNIT/ML PEN 5/14/2024 at hs Self Yes Yes   Sig: Inject 33 Units Subcutaneous at bedtime   montelukast (SINGULAIR) 10 MG tablet 5/14/2024 at hs Self Yes Yes   Sig: Take 10 mg by mouth at bedtime   pioglitazone (ACTOS) 15 MG tablet 5/14/2024 at hs Self Yes Yes   Sig: Take 15 mg by mouth at bedtime   sertraline (ZOLOFT) 100 MG tablet 5/14/2024 at hs Self Yes Yes   Sig: Take 100 mg by mouth at bedtime      Facility-Administered Medications: None        Review of Systems    The 10 point Review of Systems is negative other than noted in the HPI or here.   Physical Exam   Vital Signs: Temp: 99.4  F (37.4  C) Temp src: Oral BP: 123/71 Pulse: 76   Resp: 25 SpO2: 96 %      Weight: 270 lbs 0 oz    General appearance: Morbidly obese, awake, Alert, Cooperative, not in any obvious distress and appears stated age   HEENT: Normocephalic, atraumatic, conjunctiva clear without icterus and ears without discharge  Lungs: Clear to auscultation bilaterally, no wheezing, good air exchange, normal work of breathing  Cardiovascular: Regular Rate and Rythm, normal apical impulse, normal S1 and S2, no lower extremity edema bilaterally  Abdomen:  Soft, non-tender and Non-distended, active bowel sounds  Skin: Skin color, texture normal and bruising or bleeding. No rashes or lesions over face, neck, arms and legs, turgor normal.  Musculoskeletal: No bony deformities or joint tenderness. Normal ROM upon flexion & extension.   Neurologic: Alert & Oriented X 3, Facial symmetry preserved and upper & lower extremities moving well with symmetry  Psychiatric: Calm, normal eye contact and normal affect      Medical Decision Making       60 MINUTES SPENT BY ME on the date of service doing chart review, history, exam, documentation & further activities per the note.      Data     I have personally reviewed the following data over the past 24 hrs:    7.0  \   13.4   / 226     140 105 19.6 /  122 (H)   4.1 24 1.08 (H) \     Trop: 7 BNP: 160       Imaging results reviewed over the past 24 hrs:   Recent Results (from the past 24 hour(s))   XR Chest Port 1 View    Narrative    EXAM: XR CHEST PORT 1 VIEW  LOCATION: [facility]  DATE: 5/15/2024    INDICATION: chest pain  COMPARISON: 9/23/2016      Impression    IMPRESSION: Negative chest. Multiple rotator cuff suture anchor is again seen in the right humeral head.   NM Lexiscan stress test (nuc card)   Result Value    Target

## 2024-05-15 NOTE — MEDICATION SCRIBE - ADMISSION MEDICATION HISTORY
Medication Scribe Admission Medication History    Admission medication history is complete. The information provided in this note is only as accurate as the sources available at the time of the update.    Information Source(s): Family member via in-person    Pertinent Information: Patient takes all medication at night.    Changes made to PTA medication list:  Added: Atorvastatin, Jardiance, Diclofenace, Flonase, Montelukast, Pioglitazone, Wegovy (per patient)  Deleted: Estrogen, Norco (per patient)  Changed: None    Allergies reviewed with patient and updates made in EHR: yes    Medication History Completed By: Isabel Horta 5/15/2024 4:40 AM    PTA Med List   Medication Sig Last Dose    albuterol (PROVENTIL) (2.5 MG/3ML) 0.083% neb solution Inhale 2.5 mg into the lungs every 4 hours as needed for shortness of breath Unknown at prn    atorvastatin (LIPITOR) 80 MG tablet Take 1 tablet by mouth at bedtime 5/14/2024 at hs    diclofenac (VOLTAREN) 1 % topical gel Apply 4 g topically 4 times daily as needed for moderate pain Unknown at prn    empagliflozin (JARDIANCE) 25 MG TABS tablet Take 25 mg by mouth at bedtime 5/14/2024 at hs    fluticasone (FLONASE) 50 MCG/ACT nasal spray Spray 2 sprays into both nostrils daily as needed for allergies Unknown at prn    insulin glargine (LANTUS) 100 UNIT/ML PEN Inject 33 Units Subcutaneous at bedtime 5/14/2024 at hs    montelukast (SINGULAIR) 10 MG tablet Take 10 mg by mouth at bedtime 5/14/2024 at hs    pioglitazone (ACTOS) 15 MG tablet Take 15 mg by mouth at bedtime 5/14/2024 at hs    sertraline (ZOLOFT) 100 MG tablet Take 100 mg by mouth at bedtime 5/14/2024 at hs    WEGOVY 0.25 MG/0.5ML pen Inject 0.25 mg Subcutaneous every 7 days 5/10/2024 at am

## 2024-05-15 NOTE — ED PROVIDER NOTES
EMERGENCY DEPARTMENT ENCOUNTER      NAME: Iman Martinez  AGE: 64 year old female  YOB: 1960  MRN: [id number]  EVALUATION DATE & TIME: 5/15/2024 12:53 AM    PCP: Marshall Phelps    ED PROVIDER: Isaias Mancilla M.D.      Chief Complaint   Patient presents with    Chest Pain         FINAL IMPRESSION:  1.  Acute chest pain.      ED COURSE & MEDICAL DECISION MAKIN:20 AM.  I met with the patient to gather history and to perform my initial exam. We discussed plans for the ED course, including diagnostic testing and treatment. PPE worn: cloth mask.  Patient with onset around 11 or 1130 of chest pain described as a heaviness with radiation down the left arm.  No associated symptoms.  Improved with some nitroglycerin but still 7 out of 10 at this time.  3:11 AM.  EKG unremarkable.  Chest x-ray negative.  Chemistries negative other than sugar 151.  Troponin and BNP negative.  CBC negative.  Pain much improved and down to 2 or 3 out of 10 from previous 7 out of 10.  Patient looks comfortable and is no longer in distress.  Plan admit patient to cardiac telemetry observation for cardiac rule out.  Patient in agreement.  Will page the admitting service.  3:34 AM.  Hospitalist called back and is in agreement.    Pertinent Labs & Imaging studies reviewed. (See chart for details)  64 year old female presents to the Emergency Department for evaluation of chest pain.    At the conclusion of the encounter I discussed the results of all of the tests and the disposition. The questions were answered. The patient or family acknowledged understanding and was agreeable with the care plan.              Medical Decision Making  Obtained supplemental history: EMS.  Reviewed external records: Both inpatient and outpatient computer records reviewed.  Care impacted by chronic illness: Diabetes type 1, anxiety, obesity  Care significantly affected by social determinants of health:Access to Medical Care  Did you consider but not  order tests?: Work up considered but not performed and documented in chart, if applicable  Did you interpret images independently?: Independent interpretation of ECG and images noted in documentation, when applicable.  Consultation discussion with other provider: We will probably end up discussing care with the admitting service.  Probable admission given chest pain presentation.      MEDICATIONS GIVEN IN THE EMERGENCY:  Medications   nitroGLYcerin (NITROSTAT) sublingual tablet 0.4 mg (has no administration in time range)   ondansetron (ZOFRAN) injection 4 mg (has no administration in time range)   morphine (PF) injection 4 mg (has no administration in time range)       NEW PRESCRIPTIONS STARTED AT TODAY'S ER VISIT  New Prescriptions    No medications on file          =================================================================    HPI    Patient information was obtained from: EMS and the patient.    Use of : N/A         Iman Martinez is a 64 year old female with a pertinent history of diabetes type 1 who presents to this ED today for evaluation of onset 1130 tonight of chest pain in the left chest radiating down to the left arm.  Rated 10 out of 10.  She took adult aspirin at home.  Medics gave her 2 nitroglycerin and pain down to 7 out of 10.  It is at that level currently.  She denies a cardiac history but notes her father had a hip cardiac history of heart attacks in his 50s and 60s.    She does not identify any waxing or waning symptoms otherwise, exacerbating or alleviating features, associated symptoms except as mentioned.  She otherwise denies any pain related complaints.    REVIEW OF SYSTEMS   Review of Systems patient complaining of chest pain.  No other symptoms.    PAST MEDICAL HISTORY:  Past Medical History:   Diagnosis Date    Anxiety     Diabetes (H)        PAST SURGICAL HISTORY:  Past Surgical History:   Procedure Laterality Date    CHOLECYSTECTOMY      GYN SURGERY      ORTHOPEDIC  "SURGERY             CURRENT MEDICATIONS:    Estrogens Conjugated (PREMARIN PO)  HYDROcodone-acetaminophen (NORCO) 5-325 MG per tablet  insulin glargine (LANTUS) 100 UNIT/ML PEN  Sertraline HCl (ZOLOFT PO)  UNKNOWN TO PATIENT        ALLERGIES:  Allergies   Allergen Reactions    Hydromorphone Itching    Metformin Diarrhea    Pcn [Penicillins] Rash    Soap Rash     \"Ivory\"       FAMILY HISTORY:  No family history on file.    SOCIAL HISTORY:   Social History     Socioeconomic History    Marital status:    Tobacco Use    Smoking status: Some Days   Substance and Sexual Activity    Alcohol use: No    Drug use: No     Social Determinants of Health     Financial Resource Strain: Low Risk  (6/9/2023)    Received from [facility]    Financial Resource Strain     Difficulty of Paying Living Expenses: 3   Food Insecurity: No Food Insecurity (6/9/2023)    Received from [facility]    Food Insecurity     Worried About Running Out of Food in the Last Year: 1   Transportation Needs: No Transportation Needs (6/9/2023)    Received from [facility]    Transportation Needs     Lack of Transportation (Medical): 1   Social Connections: Socially Integrated (6/9/2023)    Received from [facility]    Social Connections     Frequency of Communication with Friends and Family: 0   Housing Stability: Low Risk  (6/9/2023)    Received from [facility]    Housing Stability     Unable to Pay for Housing in the Last Year: 1         Former smoker.  No current drugs, alcohol, or tobacco.  VITALS:  /77   Pulse 74   Temp 99.4  F (37.4  C) (Oral)   Resp 23   Ht 1.6 m (5' 3\")   Wt 122.5 kg (270 lb)   SpO2 96%   BMI 47.83 kg/m      PHYSICAL EXAM    Vital Signs:  /77   Pulse 74   Temp 99.4  F (37.4  C) (Oral)   Resp 23   Ht 1.6 m (5' 3\")   Wt 122.5 kg (270 lb)   " SpO2 96%   BMI 47.83 kg/m    General:  On entering the room she appears to be in moderate pain or discomfort.  Neck:  Neck supple with full range of motion and nontender.    Back:  Back and spine are nontender.  No costovertebral angle tenderness.    HEENT:  Oropharynx clear with moist mucous membranes.  HEENT unremarkable.    Pulmonary:  Chest clear to auscultation without rhonchi rales or wheezing.  Pain not reproducible with palpation, inspiration, or movement.  Cardiovascular:  Cardiac regular rate and rhythm without murmurs rubs or gallops.    Abdomen:  Abdomen soft nontender.  There is no rebound or guarding.    Muskuloskeletal:  She moves all 4 without any difficulty and has normal neurovascular exams.  Extremities without clubbing, cyanosis, or edema.  Legs and calves are nontender.    Neuro:  She is alert and oriented ×3 and moves all extremities symmetrically.    Psych:  Normal affect.    Skin:  Unremarkable and warm and dry.       LAB:  All pertinent labs reviewed and interpreted.  Labs Ordered and Resulted from Time of ED Arrival to Time of ED Departure   BASIC METABOLIC PANEL - Abnormal       Result Value    Sodium 140      Potassium 4.1      Chloride 105      Carbon Dioxide (CO2) 24      Anion Gap 11      Urea Nitrogen 19.6      Creatinine 1.08 (*)     GFR Estimate 57 (*)     Calcium 9.6      Glucose 151 (*)    TROPONIN T, HIGH SENSITIVITY - Normal    Troponin T, High Sensitivity 6     NT PROBNP INPATIENT - Normal    N terminal Pro BNP Inpatient 160     CBC WITH PLATELETS AND DIFFERENTIAL    WBC Count 7.0      RBC Count 4.56      Hemoglobin 13.4      Hematocrit 41.6      MCV 91      MCH 29.4      MCHC 32.2      RDW 14.4      Platelet Count 226      % Neutrophils 59      % Lymphocytes 33      % Monocytes 6      % Eosinophils 2      % Basophils 0      % Immature Granulocytes 0      NRBCs per 100 WBC 0      Absolute Neutrophils 4.1      Absolute Lymphocytes 2.3      Absolute Monocytes 0.4      Absolute  Eosinophils 0.2      Absolute Basophils 0.0      Absolute Immature Granulocytes 0.0      Absolute NRBCs 0.0         RADIOLOGY:  Reviewed all pertinent imaging. Please see official radiology report.  XR Chest Port 1 View   Final Result   IMPRESSION: Negative chest. Multiple rotator cuff suture anchor is again seen in the right humeral head.                 EKG:    Normal sinus rhythm at 75, normal EKG.  Same as previous EKG of September 2016.    I have independently reviewed and interpreted the EKG(s) documented above.    PROCEDURES:           Isaias Mancilla M.D.  Emergency Medicine  [facility] EMERGENCY DEPARTMENT  [address] [place] [id number] [phone]  Dept: [phone]       Isaias Mancilla MD  05/15/24 0312       Isaias Mancilla MD  05/15/24 1536

## 2024-05-15 NOTE — ED NOTES
Bed: JNED-37  Expected date: 5/15/24  Expected time: 2:32 PM  Means of arrival:   Comments:  ED 18

## 2024-05-15 NOTE — PROGRESS NOTES
Occupational Therapy      05/15/24 1400   Appointment Info   Signing Clinician's Name / Credentials (OT) Sulma Hatfield OTR/L   Quick Adds   Quick Adds Certification   Living Environment   People in Home spouse   Current Living Arrangements apartment   Home Accessibility no concerns   Transportation Anticipated family or friend will provide   Living Environment Comments able to live on one level Tub/shower w/ GB   Self-Care   Usual Activity Tolerance good   Current Activity Tolerance moderate   Regular Exercise No   Equipment Currently Used at Home none   Fall history within last six months no   Activity/Exercise/Self-Care Comment Ind. w/ ADL tasks   Instrumental Activities of Daily Living (IADL)   IADL Comments Ind. w/ IADL routine   General Information   Onset of Illness/Injury or Date of Surgery 05/15/24   Referring Physician Noe Blackmon MD   Additional Occupational Profile Info/Pertinent History of Current Problem 64 year old female with history of morbid obesity, hyperlipidemia, depression/anxiety disorder, and type 1 diabetes admitted on 5/15/2024 with unstable angina.     EKG showed normal sinus rhythm.  Echocardiogram demonstrated mildly reduced LV systolic function with LVEF of 45 to 50%.  Report of Lexiscan stress test is pending. per cardiology note after OT evaluation completed- will complete angio on 5/16   Existing Precautions/Restrictions fall   Cognitive Status Examination   Orientation Status orientation to person, place and time   Range of Motion Comprehensive   General Range of Motion bilateral upper extremity ROM WNL   Comment, General Range of Motion Pt reported having stroke in 2020- strength has resolved in UE  but LE still weaker   Bed Mobility   Bed Mobility No deficits identified   Transfers   Transfers sit-stand transfer   Sit-Stand Transfer   Sit-Stand [place] (Transfers) verbal cues;supervision   Balance   Balance Assessment no deficits identified   Activities of Daily  Living   BADL Assessment/Intervention lower body dressing   Lower Body Dressing Assessment/Training   [place] (Lower Body Dressing) socks;shoes/slippers;supervision   Clinical Impression   Criteria for Skilled Therapeutic Interventions Met (OT) Yes, treatment indicated   OT Diagnosis decreased act. tolerance   OT Problem List-Impairments impacting ADL problems related to;activity tolerance impaired;mobility   Assessment of Occupational Performance 1-3 Performance Deficits   Identified Performance Deficits decreased act. tolerance/endurnace   Planned Therapy Interventions (OT) IADL retraining;ADL retraining;strengthening   Clinical Decision Making Complexity (OT) problem focused assessment/low complexity   Risk & Benefits of therapy have been explained evaluation/treatment results reviewed;patient;spouse/significant other   OT Total Evaluation Time   OT Eval, Low Complexity Minutes (52764) 10   Therapy Certification   Medical Diagnosis weakness/chest pain   Start of Care Date 05/15/24   Certification date from 05/15/24   Certification date to 05/22/24   OT Goals   Therapy Frequency (OT) Daily   OT Predicted Duration/Target Date for Goal Attainment 05/22/24   OT Goals Hygiene/Grooming;Toilet Transfer/Toileting;Cardiac Phase 1   OT: Hygiene/Grooming modified independent;using adaptive equipment   OT: Toilet Transfer/Toileting Modified independent   OT: Perform aerobic activity with stable cardiovascular response continuous;15 minutes   Self-Care/Home Management   Self-Care/Home Mgmt/ADL, Compensatory, Meal Prep Minutes (52454) 8   Symptoms Noted During/After Treatment (Meal Preparation/Planning Training) fatigue;dizziness   Treatment Detail/Skilled Intervention Pt up in bed upon OT arrival- Cardiology student okay'd for therapy. Pt had no intial c/o and stated she was feeling quite better than upon arrival- pt STS supervision no LOB pt stated slight dizziness w/ standing /60s HR 67 pt ambulated short  "distance w/in room SBA w/o device no LOB but pt reported dizziness remained but didnt get worse, BP then checked and was ~143/65 HR in 60s no c/o once seated. Pt reports she is diabetic and has not eaten/drank much today d/t stress test NA notified as pt may benefit from BG check again and potentially get some food. Pt did state she still feels \"chest pressure\" like when she came in she stated she told the cardiology student already.   OT Discharge Planning   OT Plan angio 5/16, endurance/act. tolerance   OT Discharge Recommendation (DC Rec) home with assist   OT Rationale for DC Rec Pending medical status OT expect pt to progress for safe d/c home w/ support from spouse. Pt limited w/ mobility d/t reports of dizziness/ slightl chest pressure which has been present since upon arrival [facility]. P   Total Session Time   Timed Code Treatment Minutes 8   Total Session Time (sum of timed and untimed services) 18      [facility]  OUTPATIENT OCCUPATIONAL THERAPY  EVALUATION  PLAN OF TREATMENT FOR OUTPATIENT REHABILITATION  (COMPLETE FOR INITIAL CLAIMS ONLY)  Patient's Last Name, First Name, M.I.  YOB: 1960  Iman Martinez                          Provider's Name  [facility] Medical Record No.  [id number]                             Onset Date:  05/15/24   Start of Care Date:  05/15/24   Type:     ___PT   _X_OT   ___SLP Medical Diagnosis:  weakness/chest pain                    OT Diagnosis:  decreased act. tolerance Visits from SOC:  1     See note for plan of treatment, functional goals and certification details    I CERTIFY THE NEED FOR THESE SERVICES FURNISHED UNDER        THIS PLAN OF TREATMENT AND WHILE UNDER MY CARE     (Physician co-signature of this document indicates review and certification of the therapy plan).                               "

## 2024-05-16 VITALS
WEIGHT: 270 LBS | OXYGEN SATURATION: 94 % | TEMPERATURE: 98.2 F | HEART RATE: 69 BPM | BODY MASS INDEX: 47.84 KG/M2 | SYSTOLIC BLOOD PRESSURE: 124 MMHG | DIASTOLIC BLOOD PRESSURE: 59 MMHG | HEIGHT: 63 IN | RESPIRATION RATE: 22 BRPM

## 2024-05-16 DIAGNOSIS — I42.9 CARDIOMYOPATHY, UNSPECIFIED TYPE (H): Primary | ICD-10-CM

## 2024-05-16 PROBLEM — R94.39 ABNORMAL CARDIOVASCULAR STRESS TEST: Status: ACTIVE | Noted: 2024-05-16

## 2024-05-16 LAB
ATRIAL RATE - MUSE: 68 BPM
DIASTOLIC BLOOD PRESSURE - MUSE: NORMAL MMHG
GLUCOSE BLDC GLUCOMTR-MCNC: 88 MG/DL (ref 70–99)
GLUCOSE BLDC GLUCOMTR-MCNC: 93 MG/DL (ref 70–99)
GLUCOSE BLDC GLUCOMTR-MCNC: 93 MG/DL (ref 70–99)
INTERPRETATION ECG - MUSE: NORMAL
P AXIS - MUSE: 0 DEGREES
PR INTERVAL - MUSE: 144 MS
QRS DURATION - MUSE: 110 MS
QT - MUSE: 418 MS
QTC - MUSE: 444 MS
R AXIS - MUSE: 44 DEGREES
SYSTOLIC BLOOD PRESSURE - MUSE: NORMAL MMHG
T AXIS - MUSE: 21 DEGREES
VENTRICULAR RATE- MUSE: 68 BPM

## 2024-05-16 PROCEDURE — 250N000013 HC RX MED GY IP 250 OP 250 PS 637: Performed by: EMERGENCY MEDICINE

## 2024-05-16 PROCEDURE — G0378 HOSPITAL OBSERVATION PER HR: HCPCS

## 2024-05-16 PROCEDURE — 250N000013 HC RX MED GY IP 250 OP 250 PS 637: Performed by: GENERAL ACUTE CARE HOSPITAL

## 2024-05-16 PROCEDURE — 93005 ELECTROCARDIOGRAM TRACING: CPT

## 2024-05-16 PROCEDURE — C1887 CATHETER, GUIDING: HCPCS | Performed by: INTERNAL MEDICINE

## 2024-05-16 PROCEDURE — C1894 INTRO/SHEATH, NON-LASER: HCPCS | Performed by: INTERNAL MEDICINE

## 2024-05-16 PROCEDURE — 99232 SBSQ HOSP IP/OBS MODERATE 35: CPT | Performed by: GENERAL ACUTE CARE HOSPITAL

## 2024-05-16 PROCEDURE — 258N000003 HC RX IP 258 OP 636: Performed by: GENERAL ACUTE CARE HOSPITAL

## 2024-05-16 PROCEDURE — 82962 GLUCOSE BLOOD TEST: CPT

## 2024-05-16 PROCEDURE — 250N000011 HC RX IP 250 OP 636: Performed by: INTERNAL MEDICINE

## 2024-05-16 PROCEDURE — 255N000002 HC RX 255 OP 636: Performed by: INTERNAL MEDICINE

## 2024-05-16 PROCEDURE — 96361 HYDRATE IV INFUSION ADD-ON: CPT

## 2024-05-16 PROCEDURE — 93458 L HRT ARTERY/VENTRICLE ANGIO: CPT | Performed by: INTERNAL MEDICINE

## 2024-05-16 PROCEDURE — 93010 ELECTROCARDIOGRAM REPORT: CPT | Performed by: STUDENT IN AN ORGANIZED HEALTH CARE EDUCATION/TRAINING PROGRAM

## 2024-05-16 PROCEDURE — 272N000001 HC OR GENERAL SUPPLY STERILE: Performed by: INTERNAL MEDICINE

## 2024-05-16 PROCEDURE — 96375 TX/PRO/DX INJ NEW DRUG ADDON: CPT

## 2024-05-16 PROCEDURE — 93458 L HRT ARTERY/VENTRICLE ANGIO: CPT | Mod: 26 | Performed by: INTERNAL MEDICINE

## 2024-05-16 PROCEDURE — 99207 PR APP CREDIT; MD BILLING SHARED VISIT: CPT | Performed by: EMERGENCY MEDICINE

## 2024-05-16 PROCEDURE — 999N000099 HC STATISTIC MODERATE SEDATION < 10 MIN: Performed by: INTERNAL MEDICINE

## 2024-05-16 PROCEDURE — 99239 HOSP IP/OBS DSCHRG MGMT >30: CPT | Mod: FS

## 2024-05-16 PROCEDURE — 250N000009 HC RX 250: Performed by: INTERNAL MEDICINE

## 2024-05-16 RX ORDER — SODIUM CHLORIDE 9 MG/ML
75 INJECTION, SOLUTION INTRAVENOUS CONTINUOUS
Status: ACTIVE | OUTPATIENT
Start: 2024-05-16 | End: 2024-05-16

## 2024-05-16 RX ORDER — HYDRALAZINE HYDROCHLORIDE 20 MG/ML
10 INJECTION INTRAMUSCULAR; INTRAVENOUS
Status: DISCONTINUED | OUTPATIENT
Start: 2024-05-16 | End: 2024-05-16 | Stop reason: HOSPADM

## 2024-05-16 RX ORDER — ASPIRIN 81 MG/1
243 TABLET, CHEWABLE ORAL ONCE
Status: COMPLETED | OUTPATIENT
Start: 2024-05-16 | End: 2024-05-16

## 2024-05-16 RX ORDER — OXYCODONE HYDROCHLORIDE 5 MG/1
10 TABLET ORAL EVERY 4 HOURS PRN
Status: DISCONTINUED | OUTPATIENT
Start: 2024-05-16 | End: 2024-05-16 | Stop reason: HOSPADM

## 2024-05-16 RX ORDER — NALOXONE HYDROCHLORIDE 0.4 MG/ML
0.2 INJECTION, SOLUTION INTRAMUSCULAR; INTRAVENOUS; SUBCUTANEOUS
Status: DISCONTINUED | OUTPATIENT
Start: 2024-05-16 | End: 2024-05-16 | Stop reason: HOSPADM

## 2024-05-16 RX ORDER — FENTANYL CITRATE 50 UG/ML
INJECTION, SOLUTION INTRAMUSCULAR; INTRAVENOUS
Status: DISCONTINUED | OUTPATIENT
Start: 2024-05-16 | End: 2024-05-16 | Stop reason: HOSPADM

## 2024-05-16 RX ORDER — NALOXONE HYDROCHLORIDE 0.4 MG/ML
0.4 INJECTION, SOLUTION INTRAMUSCULAR; INTRAVENOUS; SUBCUTANEOUS
Status: DISCONTINUED | OUTPATIENT
Start: 2024-05-16 | End: 2024-05-16 | Stop reason: HOSPADM

## 2024-05-16 RX ORDER — LIDOCAINE 40 MG/G
CREAM TOPICAL
Status: DISCONTINUED | OUTPATIENT
Start: 2024-05-16 | End: 2024-05-16 | Stop reason: HOSPADM

## 2024-05-16 RX ORDER — LOSARTAN POTASSIUM 25 MG/1
25 TABLET ORAL DAILY
Status: DISCONTINUED | OUTPATIENT
Start: 2024-05-16 | End: 2024-05-16 | Stop reason: HOSPADM

## 2024-05-16 RX ORDER — SODIUM CHLORIDE 9 MG/ML
INJECTION, SOLUTION INTRAVENOUS CONTINUOUS
Status: DISCONTINUED | OUTPATIENT
Start: 2024-05-16 | End: 2024-05-16 | Stop reason: HOSPADM

## 2024-05-16 RX ORDER — FENTANYL CITRATE 50 UG/ML
25 INJECTION, SOLUTION INTRAMUSCULAR; INTRAVENOUS
Status: DISCONTINUED | OUTPATIENT
Start: 2024-05-16 | End: 2024-05-16 | Stop reason: HOSPADM

## 2024-05-16 RX ORDER — OXYCODONE HYDROCHLORIDE 5 MG/1
5 TABLET ORAL EVERY 4 HOURS PRN
Status: DISCONTINUED | OUTPATIENT
Start: 2024-05-16 | End: 2024-05-16 | Stop reason: HOSPADM

## 2024-05-16 RX ORDER — HEPARIN SODIUM 200 [USP'U]/100ML
INJECTION, SOLUTION INTRAVENOUS
Status: DISCONTINUED | OUTPATIENT
Start: 2024-05-16 | End: 2024-05-16 | Stop reason: HOSPADM

## 2024-05-16 RX ORDER — ASPIRIN 325 MG
325 TABLET ORAL ONCE
Status: COMPLETED | OUTPATIENT
Start: 2024-05-16 | End: 2024-05-16

## 2024-05-16 RX ORDER — IODIXANOL 320 MG/ML
INJECTION, SOLUTION INTRAVASCULAR
Status: DISCONTINUED | OUTPATIENT
Start: 2024-05-16 | End: 2024-05-16 | Stop reason: HOSPADM

## 2024-05-16 RX ORDER — ASPIRIN 81 MG/1
81 TABLET ORAL DAILY
Qty: 30 TABLET | Refills: 0 | Status: SHIPPED | OUTPATIENT
Start: 2024-05-17

## 2024-05-16 RX ORDER — ATROPINE SULFATE 0.1 MG/ML
0.5 INJECTION INTRAVENOUS
Status: DISCONTINUED | OUTPATIENT
Start: 2024-05-16 | End: 2024-05-16 | Stop reason: HOSPADM

## 2024-05-16 RX ORDER — ACETAMINOPHEN 325 MG/1
650 TABLET ORAL EVERY 4 HOURS PRN
Status: DISCONTINUED | OUTPATIENT
Start: 2024-05-16 | End: 2024-05-16 | Stop reason: HOSPADM

## 2024-05-16 RX ORDER — FLUMAZENIL 0.1 MG/ML
0.2 INJECTION, SOLUTION INTRAVENOUS
Status: DISCONTINUED | OUTPATIENT
Start: 2024-05-16 | End: 2024-05-16 | Stop reason: HOSPADM

## 2024-05-16 RX ORDER — LOSARTAN POTASSIUM 25 MG/1
25 TABLET ORAL DAILY
Qty: 30 TABLET | Refills: 0 | Status: SHIPPED | OUTPATIENT
Start: 2024-05-16

## 2024-05-16 RX ADMIN — SODIUM CHLORIDE: 9 INJECTION, SOLUTION INTRAVENOUS at 09:50

## 2024-05-16 RX ADMIN — ASPIRIN 325 MG ORAL TABLET 325 MG: 325 PILL ORAL at 08:48

## 2024-05-16 RX ADMIN — DIAZEPAM 5 MG: 5 TABLET ORAL at 10:36

## 2024-05-16 ASSESSMENT — ACTIVITIES OF DAILY LIVING (ADL)
ADLS_ACUITY_SCORE: 22

## 2024-05-16 ASSESSMENT — EJECTION FRACTION: EF_VALUE: .33

## 2024-05-16 NOTE — PROGRESS NOTES
PRIMARY DIAGNOSIS: CHEST PAIN  OUTPATIENT/OBSERVATION GOALS TO BE MET BEFORE DISCHARGE:  1. Ruled out acute coronary syndrome (negative or stable Troponin):  Yes, Trop 6 at 0839.   2. Pain Status: Improved with use of alternative comfort measures i.e.: positioning, rest. Pt stated same pain from prev weeks.  3. Appropriate provocative testing performed: Yes  - Stress Test Procedure: Echo  - Interpretation of cardiac rhythm per telemetry tech: SR with BBB    4. Cleared by Consultants (if applicable):No  5. Return to near baseline physical activity: No  Discharge Planner Nurse   Safe discharge environment identified: No  Barriers to discharge: Yes       Entered by: Tia Velásquez RN 05/16/2024 5:29 AM     Pt VSS in RA. Pain is manageable. Pt was able to sleep good. Up to bathroom with SBA. NPO midnight.     Please review provider order for any additional goals.   Nurse to notify provider when observation goals have been met and patient is ready for discharge.

## 2024-05-16 NOTE — PLAN OF CARE
PRIMARY DIAGNOSIS: Chest Pain  OUTPATIENT/OBSERVATION GOALS TO BE MET BEFORE DISCHARGE:  ADLs back to baseline: Yes    Activity and level of assistance: Ambulating independently.    Pain status: Chest pressure.     Return to near baseline physical activity: Yes     Discharge Planner Nurse   Safe discharge environment identified: Yes  Barriers to discharge: Yes. Getting an angiogram today.        Entered by: Cedrick Petty RN 05/16/2024 8:48 AM     Please review provider order for any additional goals.   Nurse to notify provider when observation goals have been met and patient is ready for discharge.

## 2024-05-16 NOTE — PLAN OF CARE
"PRIMARY DIAGNOSIS: \"GENERIC\" NURSING  OUTPATIENT/OBSERVATION GOALS TO BE MET BEFORE DISCHARGE:  ADLs back to baseline: Yes    Activity and level of assistance: Up with standby assistance.    Pain status: Pain free.    Return to near baseline physical activity: Yes     Discharge Planner Nurse   Safe discharge environment identified: Yes  Barriers to discharge: No       Entered by: Cedrick Petty RN 05/16/2024 2:58 PM     Please review provider order for any additional goals.   Nurse to notify provider when observation goals have been met and patient is ready for discharge.      "

## 2024-05-16 NOTE — PROGRESS NOTES
Patient admitted to room 12 at approximately 2200 via cart from emergency room.  Reason for Admission: Chest Pain  Report received from: Theron AGUSTIN RN  Patient was accompanied by Self.  Discharge transportation provided by:  Patient ambulated/transferred:  with stand-by assist. self.  Patient is alert and orientated x 4.  Outpatient Observation education provided to: (patient, family, friend)  MDRO Education done if applicable (MRSA, VRE, etc)  Safety risks were identified during admission:  none.   Yellow risk/fall band applied:  No  Detailed Belongings: Cloths, cellphone, Cellphone .       Pt A&O. VSS in RA. Pressure left chest pain. Up with SBA to bathroom.       Tia Velásquez RN

## 2024-05-16 NOTE — PROGRESS NOTES
PRIMARY DIAGNOSIS: CHEST PAIN  OUTPATIENT/OBSERVATION GOALS TO BE MET BEFORE DISCHARGE:  1. Ruled out acute coronary syndrome (negative or stable Troponin):  Yes, Trop 6 at 0839.   2. Pain Status: Improved with use of alternative comfort measures i.e.: positioning  3. Appropriate provocative testing performed: Yes  - Stress Test Procedure: Echo, Kiersten  - Interpretation of cardiac rhythm per telemetry tech: SR with BBB    4. Cleared by Consultants (if applicable):No  5. Return to near baseline physical activity: No  Discharge Planner Nurse   Safe discharge environment identified: No  Barriers to discharge: Yes       Entered by: Tia Velásquez RN 05/16/2024 1:01 AM     Please review provider order for any additional goals.   Nurse to notify provider when observation goals have been met and patient is ready for discharge.

## 2024-05-16 NOTE — PLAN OF CARE
Patient discharged to home at 5:23 PM  via Private Car.  Accompanied by spouse and staff.  Discharge instructions were reviewed with patient, opportunity offered to ask questions.    Prescriptions e-prescribed to pharmacy.  Access discontinued: Yes  Care plan and education discontinued: Yes  Belongings were sent home with patient/family:   cell phone, glasses, clothing .

## 2024-05-16 NOTE — PLAN OF CARE
Problem: Adult Inpatient Plan of Care  Goal: Optimal Comfort and Wellbeing  Outcome: Progressing     Problem: Chest Pain  Goal: Resolution of Chest Pain Symptoms  Outcome: Progressing    Goal Outcome Evaluation:      Plan of Care Reviewed With: patient      A&Ox4. RA. On tele - SR w/BBB. Angiogram done today. Came back to obs @1445. R PIV SL. Plans to discharge home this evening. Spouse to transport. Done.

## 2024-05-16 NOTE — PRE-PROCEDURE
GENERAL PRE-PROCEDURE:   Procedure:  Left heart cath, coronary angiogram, possible percutaneous coronary intervention  Date/Time:  5/16/2024 9:39 AM    Written consent obtained?: Yes    Risks and benefits: Risks, benefits and alternatives were discussed    Consent given by:  Patient  Patient states understanding of procedure being performed: Yes    Patient's understanding of procedure matches consent: Yes    Procedure consent matches procedure scheduled: Yes    Expected level of sedation:  Moderate  Appropriately NPO:  Yes  ASA Class:  3  Mallampati  :  Grade 1- soft palate, uvula, tonsillar pillars, and posterior pharyngeal wall visible  Lungs:  Lungs clear with good breath sounds bilaterally  Heart:  Normal heart sounds and rate  History & Physical reviewed:  History and physical reviewed and updates made (see comment)  H&P Comments:  Clinically Significant Risk Factors Present on Admission    Cardiovascular : Cardiomyopathy, chest discomfort, abnormal stress test    Fluid & Electrolyte Disorders : Not present on admission    Gastroenterology : Not present on admission    Hematology/Oncology : Not present on admission    Nephrology : Not present on admission    Neurology : Not present on admission    Pulmonology : Not present on admission    Systemic : Not present on admission    [unfilled]    Statement of review:  I have reviewed the lab findings, diagnostic data, medications, and the plan for sedation

## 2024-05-16 NOTE — PROGRESS NOTES
Care Management Discharge Note    Discharge Date: 05/16/2024       Discharge Disposition: Home    Discharge Services:  home no needs  Discharge DME:  n/a    Discharge Transportation: family or friend will provide    Private pay costs discussed: Not applicable    Education Provided on the Discharge Plan:  yes  Persons Notified of Discharge Plans: yes  Patient/Family in Agreement with the Plan: yes    Handoff Referral Completed: Yes    Additional Information:  No needs anticipated from CM at discharge per pt; independent at baseline. Pt to follow up with PCP post discharge if needs arise. Family to transport home.  1:25 PM    Brenna Kjellberg, BSW LSW  5/16/2024

## 2024-05-16 NOTE — PLAN OF CARE
Goal Outcome Evaluation:      Plan of Care Reviewed With: patient    Overall Patient Progress: no changeOverall Patient Progress: no change    Outcome Evaluation: Pain remains unchanged per patient. Denies dyspnea, nausea vomiting.

## 2024-05-16 NOTE — DISCHARGE SUMMARY
"[facility]  Hospitalist Discharge Summary      Date of Admission:  5/15/2024  Date of Discharge:  5/16/2024  Discharging Provider: Amy Flores NP  Discharge Service: Hospitalist Service    Discharge Diagnoses   Angina  Nonischemic cardiomyopathy    Clinically Significant Risk Factors     # DMII: A1C = 7.8 % (Ref range: <5.7 %) within past 6 months  # Severe Obesity: Estimated body mass index is 47.83 kg/m  as calculated from the following:    Height as of this encounter: 1.6 m (5' 3\").    Weight as of this encounter: 122.5 kg (270 lb).       Follow-ups Needed After Discharge   Follow-up Appointments     Follow-up and recommended labs and tests       Follow up with primary care provider, Physician No Ref-Primary, within 7   days for hospital follow- up.            Unresulted Labs Ordered in the Past 30 Days of this Admission       No orders found for last 31 day(s).            Discharge Disposition   Discharged to home  Condition at discharge: Stable    Hospital Course   Iman Martniez is a 64 year old female with history of morbid obesity, hyperlipidemia, depression/anxiety disorder, and type 1 diabetes who presents to the ER with chest pain.     She was in her usual state of health until 5/14/2024 when she developed left-sided chest pain described as sensation of pressure and radiating to the left arm.  She states chest pain had subsided on presentation to ED  She also endorsed dizziness with ambulation, however, she denies shortness of breath or palpitation.  She had a stress test done 5/15/2024 that was abnormal indicating a medium size area of moderate ischemia in the entire anterior and apical segment of the left ventricle.  Cardiology was consulted and patient was scheduled for coronary angiogram this morning.  No interventions performed.  Will discharge on aspirin.  Cardiology started losartan 25 mg daily for nonischemic cardiomyopathy.    Consultations This Hospital Stay "   PHYSICAL THERAPY ADULT IP CONSULT  OCCUPATIONAL THERAPY ADULT IP CONSULT  CARE MANAGEMENT / SOCIAL WORK IP CONSULT  CARDIOLOGY IP CONSULT  PHARMACY IP CONSULT  PHARMACY IP CONSULT  SMOKING CESSATION PROGRAM IP CONSULT    Code Status   Full Code    Time Spent on this Encounter   I, Amy Flores NP, personally saw the patient today and spent greater than 30 minutes discharging this patient.       Amy Flores NP  [facility]  [address] [place] [id number]  Phone: [phone]  Fax: [phone]  ______________________________________________________________________    Physical Exam   Vital Signs: Temp: 98.2  F (36.8  C) Temp src: Oral BP: 124/59 Pulse: 69   Resp: 22 SpO2: 94 % O2 Device: None (Room air) Oxygen Delivery: 2 LPM  Weight: 270 lbs 0 oz  Constitutional: awake, alert, cooperative, no apparent distress, and appears stated age  Respiratory: No increased work of breathing, good air exchange, clear to auscultation bilaterally, no crackles or wheezing  Cardiovascular: Normal apical impulse, regular rate and rhythm, normal S1 and S2, no S3 or S4, and no murmur noted  GI: No scars, normal bowel sounds, soft, non-distended, non-tender, no masses palpated, no hepatosplenomegally       Primary Care Physician   Physician No Ref-Primary    Discharge Orders      Reason for your hospital stay    Unstable angina     Follow-up and recommended labs and tests     Follow up with primary care provider, Physician No Ref-Primary, within 7 days for hospital follow- up.     Activity    Your activity upon discharge: activity as tolerated     Diet    Follow this diet upon discharge: Orders Placed This Encounter      As tolerated       Significant Results and Procedures   Results for orders placed or performed during the hospital encounter of 05/15/24   XR Chest Port 1 View    Narrative    EXAM: XR CHEST PORT 1 VIEW  LOCATION: [facility]  DATE:  5/15/2024    INDICATION: chest pain  COMPARISON: 2016      Impression    IMPRESSION: Negative chest. Multiple rotator cuff suture anchor is again seen in the right humeral head.   Echocardiogram Complete    Narrative    [id number]  Former[facility]  [id number]  [id number]^TATY^ELKE^[place] [address]     Name: MADI MCKENZIE  MRN: [id number]  : 1960  Study Date: 05/15/2024 08:40 AM  Age: 64 yrs  Gender: Female  Patient Location: [facility]  Reason For Study: Chest Pain, Chest Pressure, Chest Tightness  Ordering Physician: ELKE POSEY  Performed By: AD     BSA: 2.2 m2  Height: 63 in  Weight: 270 lb  HR: 67  ______________________________________________________________________________  Procedure  Complete Echo Adult. Definity (NDC #65523-710) given intravenously.  ______________________________________________________________________________  Interpretation Summary     1. Technically difficult study.  2. The left ventricle is normal in size. Left ventricular systolic performance  is mildly reduced. The ejection fraction is estimated to be 45-50%.  3. There is mild global reduction in left ventricular systolic performance.  4. There is trace aortic insufficiency.  5. Probable normal right ventricular size and systolic performance though  right-sided structures are not clearly visualized on all views on this study.  ______________________________________________________________________________  Left ventricle:  The left ventricle is normal in size. Left ventricular systolic performance is  mildly reduced. The ejection fraction is estimated to be 45-50%. There is mild  global reduction in left ventricular systolic performance. Left ventricular  wall thickness is normal.     Assessment of left atrial pressure (LAP): The cumulative findings are  indeterminate in evaluating left atrial pressure.     Right ventricle:  Probable normal right ventricular size and  systolic performance though right-  sided structures are not clearly visualized on all views on this study.     Left atrium:  There is borderline left atrial enlargement.     Right atrium:  The right atrium is of normal size.     IVC:  The IVC is moderately dilated.     Aortic valve:  The aortic valve is comprised of three cusps. There is no significant aortic  stenosis. There is trace aortic insufficiency.     Mitral valve:  The mitral valve appears morphologically normal. There is trace mitral  insufficiency.     Tricuspid valve:  The tricuspid valve is grossly morphologically normal. There is trace  tricuspid insufficiency.     Pulmonic valve:  The pulmonic valve is grossly morphologically normal.     Thoracic aorta:  The aortic root and proximal ascending aorta are of normal dimension.     Pericardium:  There is no significant pericardial effusion.  ______________________________________________________________________________  ______________________________________________________________________________  MMode/2D Measurements & Calculations  Ao root diam: 3.4 cm  asc Aorta Diam: 3.5 cm  LVOT diam: 2.3 cm  LVOT area: 4.0 cm2  Ao root diam index Ht(cm/m): 2.1  Ao root diam index BSA (cm/m2): 1.5  Asc Ao diam index BSA (cm/m2): 1.6  Asc Ao diam index Ht(cm/m): 2.2  EF Biplane: 50.6 %     LA Volume Indexed (AL/bp): 19.2 ml/m2     Time Measurements  MM HR: 66.0 BPM     Doppler Measurements & Calculations  MV E max miko: 76.7 cm/sec  MV A max miko: 91.3 cm/sec  MV E/A: 0.84  MV max P.8 mmHg  MV mean P.3 mmHg  MV V2 VTI: 25.8 cm  MVA(VTI): 3.1 cm2  MV dec slope: 357.1 cm/sec2  MV dec time: 0.21 sec  Ao V2 max: 128.3 cm/sec  Ao max P.0 mmHg  Ao V2 mean: 98.3 cm/sec  Ao mean PG: 3.7 mmHg  Ao V2 VTI: 29.8 cm  SADIE(I,D): 2.7 cm2  SADIE(V,D): 3.4 cm2  LV V1 max P.6 mmHg  LV V1 max: 107.5 cm/sec  LV V1 VTI: 19.9 cm  SV(LVOT): 80.0 ml  SI(LVOT): 36.4 ml/m2  PA acc time: 0.11 sec  AV Miko Ratio (DI): 0.84  SADIE  Index (cm2/m2): 1.2     E/E': 10.8  E/E' avg: 10.6  Lateral E/e': 10.4  Medial E/e': 10.8  Peak E' Miko: 7.1 cm/sec  RV S Miko: 9.1 cm/sec     ______________________________________________________________________________  Report approved by: Brenda Pacheco 05/15/2024 09:36 AM         Cardiac Catheterization    Narrative    Images from the original result were not included.  Iman Martinez is a 64 year old old female with HTN, HL, DM, who is here   for w/up of abnormal stress Nuc and mild cardiomyopathy.    - non-obstructive CAD; normal L-sided filling pressures  - continue ASA 81mg daily indefinitely, atorva 80  - continue aggressive risk factor modification          Findings:  LM:no obstruction  LAD:apical narrowing, otherwise no obstruction  Lcx:mildly irregular  RCA:dominant, no obstruction    LVEDP:12    Access:  R Radial artery    Closure:   Vasc Band         NM Lexiscan stress test (nuc card)     Value    Pharmacologic Protocol Lexiscan    Test Type Pharmacological    Baseline HR 62    Baseline Systolic     Baseline Diastolic BP 70    Last Stress HR 76    Last Stress Systolic     Last Stress Diastolic BP 61    Target     PERCENT HR 85%    ST Deviation Elevation aVL 0.2mm    Deviation Time II -0.5mm    ST Elevation Amount V2 0.4mm    ST Deviation Amount he II -0.5mm    Final Resting /61    Final Resting HR 71    Max Treadmill Speed 0.0    Max Treadmill Grade 0.0    Peak Systolic /64    Peak Diastolic /64    Max HR  86    Stress Phase Resting    Stress Resting Pt Position SUPINE    Current HR 63    Current /70    Stress Phase Resting    Stress Resting Pt Position MANUAL EVENT    Current HR 74    Current /61    Stress Phase Stress    Stage Minute EXE 00:00    Exercise Stage STAGE 2    Current HR 67    Current /70    Stress Phase Stress    Stage Minute EXE 01:00    Exercise Stage STAGE 3    Current HR 65    Current /70    Stress Phase Stress     Stage Minute EXE 01:36    Exercise Stage STAGE 3    Current HR 72    Current /64    Stress Phase Stress    Stage Minute EXE 02:00    Exercise Stage STAGE 4    Current HR 86    Current /64    Stress Phase Stress    Stage Minute EXE 02:47    Exercise Stage STAGE 4    Current HR 81    Current /61    Stress Phase Stress    Stage Minute EXE 03:00    Exercise Stage STAGE 5    Current HR 79    Current /61    Stress Phase Stress    Stage Minute EXE 04:00    Exercise Stage STAGE 6    Current HR 76    Current /61    Stress Phase Stress    Stage Minute EXE 04:00    Exercise Stage STAGE 6    Current HR 76    Current /61    Stress Phase Recovery    Stage Minute REC 00:04    Exercise Stage Recovery    Current HR 74    Current /61    Stress Phase Recovery    Stage Minute REC 00:59    Exercise Stage Recovery    Current HR 76    Current /61    Stress Phase Recovery    Stage Minute REC 01:05    Exercise Stage Recovery    Current HR 75    Current /56    Stress Phase Recovery    Stage Minute REC 01:59    Exercise Stage Recovery    Current HR 76    Current /56    Stress Phase Recovery    Stage Minute REC 02:37    Exercise Stage Recovery    Current HR 74    Current /61    Stress Phase Recovery    Stage Minute REC 02:59    Exercise Stage Recovery    Current HR 73    Current /61    Stress Phase Recovery    Stage Minute REC 03:59    Exercise Stage Recovery    Current HR 72    Current /61    Stress Phase Recovery    Stage Minute REC 04:06    Exercise Stage Recovery    Current HR 71    Current /61    Max Predicted HR  55    Rate Pressure Product 11,094.0    Stress/rest perfusion ratio 1.32    Narrative      The nuclear stress test is abnormal.    There is a medium sized area of moderate ischemia in the entire   anterior and apical segment(s) of the left ventricle. This appears to be   in the left anterior descending distribution.    Stress to rest cavity ratio  "(TID) is abnormal at 1.32.    The stress electrocardiogram is negative for inducible ischemic EKG   changes.    The left ventricular ejection fraction at stress is greater than 70%.    The patient is at an intermediate risk of future cardiac ischemic   events.    There is no prior study for comparison.         Discharge Medications   Current Discharge Medication List        START taking these medications    Details   aspirin 81 MG EC tablet Take 1 tablet (81 mg) by mouth daily  Qty: 30 tablet, Refills: 0    Associated Diagnoses: Other chest pain      losartan (COZAAR) 25 MG tablet Take 1 tablet (25 mg) by mouth daily  Qty: 30 tablet, Refills: 0    Associated Diagnoses: Cardiomyopathy, unspecified type (H)           CONTINUE these medications which have NOT CHANGED    Details   albuterol (PROVENTIL) (2.5 MG/3ML) 0.083% neb solution Inhale 2.5 mg into the lungs every 4 hours as needed for shortness of breath      atorvastatin (LIPITOR) 80 MG tablet Take 1 tablet by mouth at bedtime      diclofenac (VOLTAREN) 1 % topical gel Apply 4 g topically 4 times daily as needed for moderate pain      empagliflozin (JARDIANCE) 25 MG TABS tablet Take 25 mg by mouth at bedtime      fluticasone (FLONASE) 50 MCG/ACT nasal spray Spray 2 sprays into both nostrils daily as needed for allergies      insulin glargine (LANTUS) 100 UNIT/ML PEN Inject 33 Units Subcutaneous at bedtime      montelukast (SINGULAIR) 10 MG tablet Take 10 mg by mouth at bedtime      pioglitazone (ACTOS) 15 MG tablet Take 15 mg by mouth at bedtime      sertraline (ZOLOFT) 100 MG tablet Take 100 mg by mouth at bedtime      WEGOVY 0.25 MG/0.5ML pen Inject 0.25 mg Subcutaneous every 7 days           Allergies   Allergies   Allergen Reactions    Hydromorphone Itching    Metformin Diarrhea    Pcn [Penicillins] Rash    Soap Rash     \"Ivory\"     "

## 2024-05-16 NOTE — PROGRESS NOTES
Impression and Plan     Impression:   Chest pain. Her symptoms are somewhat atypical and and coronary angiography 5/16/2024 showed no significant coronary artery disease.  Abnormal pharmacologic nuclear stress test 5/15/2024 suggesting left anterior descending artery territory ischemia representing a false positive possibly due to shifting breast attenuation.  Nonischemic cardiomyopathy with left ventricular ejection fraction 45-50%. No signs or symptoms of congestive heart failure and normal left ventricular end diastolic pressure noted on cardiac catheterization 5/16/2024.  Questionable cerebrovascular accident 3/8/2019 with residual left lower extremity weakness but normal neuroimaging.  Hyperlipidemia. Last  mg/dL.  Insulin-dependent diabetes mellitus type 2. Last hemoglobin A1c 8.1%.  Obstructive sleep apnea not currently using CPAP.  Morbid obesity with body mass index 47.83 kg/m .    Plan:  Start losartan 25 mg daily for her cardiomyopathy  Repeat limited transthoracic echocardiogram in 2-3 months with consideration of cardiac MRI if her left ventricular ejection fraction is still reduced at that time.  Aspirin 81 mg daily  Atorvastatin 80 mg daily.  We will arrange for a general cardiology advanced practitioner visit in 4 weeks and follow-up with me in 3 months  Okay to discharge home today    Primary Cardiologist: can establish with me    Subjective     - cath today showed no significant CAD  - denies chest pain or shortness of breath    Cardiac Diagnostics     ECG 5/16/2024 (personally reviewed and interpreted): SR, normal ECG    Echocardiogram 5/15/2024 (results reviewed):   1. Technically difficult study.  2. The left ventricle is normal in size. Left ventricular systolic performance is mildly reduced. The ejection fraction is estimated to be 45-50%.  3. There is mild global reduction in left ventricular systolic performance.  4. There is trace aortic insufficiency.  5. Probable normal right  "ventricular size and systolic performance though right-sided structures are not clearly visualized on all views on this study.     Cardiac cath 5/16/2024 (report reviewed):  Findings:  LM:no obstruction  LAD:apical narrowing, otherwise no obstruction  Lcx:mildly irregular  RCA:dominant, no obstruction     LVEDP:12     Access:  R Radial artery    Cardiac Stress Testing 5/15/2024 (results reviewed):     The nuclear stress test is abnormal.    There is a medium sized area of moderate ischemia in the entire anterior and apical segment(s) of the left ventricle. This appears to be in the left anterior descending distribution.    Stress to rest cavity ratio (TID) is abnormal at 1.32.    The stress electrocardiogram is negative for inducible ischemic EKG changes.    The left ventricular ejection fraction at stress is greater than 70%.    The patient is at an intermediate risk of future cardiac ischemic events.    There is no prior study for comparison.    Physical Examination       /61   Pulse 64   Temp 98.2  F (36.8  C) (Oral)   Resp 14   Ht 1.6 m (5' 3\")   Wt 122.5 kg (270 lb)   SpO2 93%   BMI 47.83 kg/m          Wt Readings from Last 3 Encounters:   05/15/24 122.5 kg (270 lb)   09/23/16 113.4 kg (250 lb)             Intake/Output Summary (Last 24 hours) at 5/16/2024 1307  Last data filed at 5/15/2024 2200  Gross per 24 hour   Intake 300 ml   Output --   Net 300 ml       General: pleasant female. No acute distress.  HENT: external ears normal. Nares patent. Mucous membranes moist.  Eyes: perrla, extraocular muscles intact. No scleral icterus.   Neck: No JVD  Lungs: clear to auscultation  COR:  regular rate and rhythm, No murmurs, rubs, or gallops  Abd: nondistended, BS present  Extrem: No edema. TR band on right wrist.         Imaging      CXR 5/15/2024 (report reviewed):  Negative chest.   Multiple rotator cuff suture anchor is again seen in the right humeral head.      CT head/neck 3/8/2019 (report " reviewed):  Normal CT angiogram of the head and neck.      MRI brain 3/8/2019 (report reviewed):  1. No acute intracranial abnormality, including no evidence of acute infarction.   2. Stable mild chronic microvascular ischemic changes within the supratentorial white matter.    Lab Results   Lab Results   Component Value Date     05/15/2024     09/23/2016    CO2 24 05/15/2024    CO2 24 09/23/2016    BUN 19.6 05/15/2024    BUN 17 09/23/2016     Lab Results   Component Value Date    WBC 7.0 05/15/2024    WBC 7.4 09/23/2016    HGB 13.4 05/15/2024    HGB 13.1 09/23/2016    HCT 41.6 05/15/2024    HCT 40.0 09/23/2016    MCV 91 05/15/2024    MCV 90 09/23/2016     05/15/2024     09/23/2016     Lab Results   Component Value Date    INR 1.09 09/23/2016           Current Inpatient Scheduled Medications   Scheduled Meds:  Current Facility-Administered Medications   Medication Dose Route Frequency Provider Last Rate Last Admin    aspirin EC tablet 81 mg  81 mg Oral Daily Abdelrahman Allen MD        atorvastatin (LIPITOR) tablet 80 mg  80 mg Oral At Bedtime Abdelrahman Allen MD   80 mg at 05/15/24 2149    [Held by provider] empagliflozin (JARDIANCE) tablet 25 mg  25 mg Oral At Bedtime Noe Blackmon MD        [Held by provider] enoxaparin ANTICOAGULANT (LOVENOX) injection 40 mg  40 mg Subcutaneous Q12H Noe Blackmon MD   40 mg at 05/15/24 2149    insulin aspart (NovoLOG) injection (RAPID ACTING)  1-3 Units Subcutaneous TID AC Abdelrahman Allen MD        insulin aspart (NovoLOG) injection (RAPID ACTING)  1-3 Units Subcutaneous At Bedtime Abdelrahman Allen MD        insulin glargine (LANTUS PEN) injection 20 Units  20 Units Subcutaneous At Bedtime Abdelrahman Allen MD   20 Units at 05/15/24 2142    losartan (COZAAR) tablet 25 mg  25 mg Oral Daily Sid Ham MD        montelukast (SINGULAIR) tablet 10 mg  10 mg Oral At Bedtime Abdelrahman Allen MD   10 mg at 05/15/24 2177     sertraline (ZOLOFT) tablet 100 mg  100 mg Oral At Bedtime Abdelrahman Allen MD   100 mg at 05/15/24 9544     Continuous Infusions:  Current Facility-Administered Medications   Medication Dose Route Frequency Provider Last Rate Last Admin    sodium chloride 0.9 % infusion  75 mL/hr Intravenous Continuous Abdelrahman Allen MD                Medications Prior to Admission   Prior to Admission medications    Medication Sig Start Date End Date Taking? Authorizing Provider   albuterol (PROVENTIL) (2.5 MG/3ML) 0.083% neb solution Inhale 2.5 mg into the lungs every 4 hours as needed for shortness of breath 2/12/24  Yes Reported, Patient   atorvastatin (LIPITOR) 80 MG tablet Take 1 tablet by mouth at bedtime 6/9/23  Yes Reported, Patient   diclofenac (VOLTAREN) 1 % topical gel Apply 4 g topically 4 times daily as needed for moderate pain 1/17/24  Yes Reported, Patient   empagliflozin (JARDIANCE) 25 MG TABS tablet Take 25 mg by mouth at bedtime 6/9/23  Yes Reported, Patient   fluticasone (FLONASE) 50 MCG/ACT nasal spray Spray 2 sprays into both nostrils daily as needed for allergies 6/9/23  Yes Reported, Patient   insulin glargine (LANTUS) 100 UNIT/ML PEN Inject 33 Units Subcutaneous at bedtime   Yes Reported, Patient   montelukast (SINGULAIR) 10 MG tablet Take 10 mg by mouth at bedtime 6/9/23  Yes Reported, Patient   pioglitazone (ACTOS) 15 MG tablet Take 15 mg by mouth at bedtime 6/9/23  Yes Reported, Patient   sertraline (ZOLOFT) 100 MG tablet Take 100 mg by mouth at bedtime 2/14/24  Yes Reported, Patient   WEGOVY 0.25 MG/0.5ML pen Inject 0.25 mg Subcutaneous every 7 days 4/17/24  Yes Reported, Patient          Sid Ham MD [facility]  Non-Invasive Cardiologist  [facility]  Pager [phone]

## 2024-05-16 NOTE — PLAN OF CARE
Problem: Adult Inpatient Plan of Care  Goal: Optimal Comfort and Wellbeing  Intervention: Monitor Pain and Promote Comfort  Recent Flowsheet Documentation  Taken 5/15/2024 2305 by Tia Velásquez RN  Pain Management Interventions:   rest   repositioned  Taken 5/15/2024 2232 by Tia Velásquez RN  Pain Management Interventions:   emotional support   rest   Goal Outcome Evaluation:      Plan of Care Reviewed With: patient        Vitals: VSS on RA  Pain: Pt denies any pain  Neuro: Confuse & awake overnight.  Cardiac: Tele - SR with BBB  Respiratory: WDL  GI/: WNL, NPO midnight.  Skin:  WNL  IV/Drains: L hand & R upper forearm saline lock  Activity: SBA to bathroom  Behavior: Pt cooperative with cares.    BG 93 at 0233.     Tia Velásquez RN

## 2024-05-16 NOTE — DISCHARGE INSTRUCTIONS

## 2024-05-16 NOTE — PLAN OF CARE
Patient admitted to room SS04 at approximately 1405 via wheel chair from emergency room.  Reason for Admission:   Report received from:   Patient was accompanied by Self.  Discharge transportation provided by:  Patient ambulated/transferred:  independently. self.  Patient is alert and orientated x 3.  Outpatient Observation education provided to: (patient, family, friend)  MDRO Education done if applicable (MRSA, VRE, etc)  Safety risks were identified during admission:  none.   Yellow risk/fall band applied:  No. Steady.  Detailed Belongings: Shoes, personal clothing.

## 2024-05-17 LAB
ATRIAL RATE - MUSE: 75 BPM
DIASTOLIC BLOOD PRESSURE - MUSE: NORMAL MMHG
INTERPRETATION ECG - MUSE: NORMAL
P AXIS - MUSE: 13 DEGREES
PR INTERVAL - MUSE: 136 MS
QRS DURATION - MUSE: 100 MS
QT - MUSE: 388 MS
QTC - MUSE: 433 MS
R AXIS - MUSE: 48 DEGREES
SYSTOLIC BLOOD PRESSURE - MUSE: NORMAL MMHG
T AXIS - MUSE: 29 DEGREES
VENTRICULAR RATE- MUSE: 75 BPM

## 2024-05-17 NOTE — PLAN OF CARE
Occupational Therapy Discharge Summary    Reason for therapy discharge:    Discharged to home.    Progress towards therapy goal(s). See goals on Care Plan in [facility] electronic health record for goal details.  Goals partially met.  Barriers to achieving goals:   discharge from facility.    Therapy recommendation(s):    Discharge home w/ support from spouse

## 2024-07-01 NOTE — PROGRESS NOTES
HEART CARE ENCOUNTER CONSULTATON NOTE      [facility]  [phone]      Assessment/Recommendations   Assessment:   Cardiomyopathy with mildly reduced ejection fraction 45-50%: Appears compensated.  Nonobstructive minimal to mild CAD on angiogram with normal LVEDP 12 mmHg.  Abnormal NM stress test suspected  due to possible breast attenuation, occasional PACs noted during test.  Bundle branch block suggested on telemetry review, although twelve-lead ECG without evidence of QRS prolongation.  - Losartan 25 mg daily  Atypical chest pain: With more strenuous exertion, but also at rest, accompanied by sensation of fast heart rates.  Nonobstructive CAD, minimal history of reflux, chest not tender to palpation, no evidence of arrhythmia thus far.  Noted increased anxiety at PCP visit 1 week ago.  Coronary artery disease: Minimal CAD, apical narrowing of LAD without obstruction LCx - Aspirin 81 mg   Hyperlipidemia, obesity: Atorvastatin 80 mg   YURI: not using CPAP  Obesity, T2DM: Insulin, Jardiance, Wegovy      Plan:   Repeat limited echocardiogram in months prior to September follow-up.  If further reduce EF or remaining mild reduction consider cardiac MRI  Perform 7-day cardiac event monitor to correlate chest pain with daily activities, rest and exertion.  Did not have arrhythmia on telemetry during hospitalization although was sedentary.  Hold atorvastatin for 2 weeks with nausea developing following hospitalization.  Determine timing of repeat lipid panel pending result of hold.  Also common Wegovy side effect, but not associated with started that medication.  Trial OTC Pepcid or Prilosec to rule out reflux involvement, although typical reflux symptoms are not consistent with current chest pain.        Follow up 9/10/2024 with Dr. Ham     History of Present Illness/Subjective    HPI: Iman Martinez is a 64 year old female with PMHx of CAD, mildly reduced ejection fraction, presents for  hospital follow-up.  Presented with chest pain atypical, although no troponin elevation.  ECG NSR without ST/T changes. Abnormal NM stress test with moderate area of ischemia in anterior apical segments of LV, stress to rest ratio abnormal 1.32.  Underwent coronary angiogram with minimal CAD, apical narrowing of LAD without obstruction LCx.  Normal BNP and LVEDP noted as of decompensation during hospitalization.    Patient reports continued intermittent chest pain symptoms.  Occur with more strenuous exertion such as walking further distances, but can also occur at rest.  Heart rate feels fast or with chest pain.  Occurred during hospitalization but was not as severe.  Not tender to palpation of chest.  His hospitalization has developed nausea.  Does have some more occasional reflux symptoms that are not similar to this chest pain.  Is uncomfortable with chest discomfort during visit today.    She started on Wegovy about 3 months ago for diabetes.        Coronary angiogram 5/15/2024  Iman Martinez is a 64 year old old female with HTN, HL, DM, who is here for w/up of abnormal stress Nuc and mild cardiomyopathy.     - non-obstructive CAD; normal L-sided filling pressures  - continue ASA 81mg daily indefinitely, atorva 80  - continue aggressive risk factor modification     Findings:  LM:no obstruction  LAD:apical narrowing, otherwise no obstruction  Lcx:mildly irregular  RCA:dominant, no obstruction     LVEDP:12     Access:  R Radial artery    NM Lexiscan stress test 5/15/2024    The nuclear stress test is abnormal.    There is a medium sized area of moderate ischemia in the entire anterior and apical segment(s) of the left ventricle. This appears to be in the left anterior descending distribution.    Stress to rest cavity ratio (TID) is abnormal at 1.32.    The stress electrocardiogram is negative for inducible ischemic EKG changes.    The left ventricular ejection fraction at stress is greater than 70%.    The  patient is at an intermediate risk of future cardiac ischemic events.    There is no prior study for comparison.     Echocardiogram 5/15/2024 Results:  1. Technically difficult study.  2. The left ventricle is normal in size. Left ventricular systolic performance  is mildly reduced. The ejection fraction is estimated to be 45-50%.  3. There is mild global reduction in left ventricular systolic performance.  4. There is trace aortic insufficiency.   5. Probable normal right ventricular size and systolic performance though  right-sided structures are not clearly visualized on all views on this study.     Physical Examination  Review of Systems   Vitals: /70 (BP Location: Left arm, Patient Position: Sitting, Cuff Size: Adult Large)   Pulse 88   Resp 16   Wt 123.4 kg (272 lb)   BMI 48.18 kg/m    BMI= Body mass index is 48.18 kg/m .  Wt Readings from Last 3 Encounters:   07/02/24 123.4 kg (272 lb)   05/15/24 122.5 kg (270 lb)   09/23/16 113.4 kg (250 lb)           ENT/Mouth: membranes moist, no oral lesions or bleeding gums.      EYES:  no scleral icterus, normal conjunctivae                    Neck: No carotid bruit or thyromegaly   Chest/Lungs:   lungs are clear to auscultation, no rales or wheezing,  equal chest wall expansion    Cardiovascular:   Regular. Normal first and second heart sounds with no murmurs, rubs, or gallops; the carotid, radial and posterior tibial pulses are intact, absent edema bilaterally        Extremities: no cyanosis or clubbing   Skin: no xanthelasma, warm.    Neurologic: no tremors     Psychiatric: alert and oriented x3, calm        Please refer above for cardiac ROS details.        Medical History  Surgical History Family History Social History   Past Medical History:   Diagnosis Date    Anxiety     Diabetes (H)      Past Surgical History:   Procedure Laterality Date    CHOLECYSTECTOMY      CV CORONARY ANGIOGRAM N/A 5/16/2024    Procedure: Coronary Angiogram;  Surgeon: Alejandro  MD Abdelrahman;  Location: [facility] CV    CV LEFT HEART CATH N/A 5/16/2024    Procedure: Left Heart Catheterization;  Surgeon: Abdelrahman Allen MD;  Location: [facility] CV    GYN SURGERY      ORTHOPEDIC SURGERY       No family history on file.     Social History     Socioeconomic History    Marital status:      Spouse name: Not on file    Number of children: Not on file    Years of education: Not on file    Highest education level: Not on file   Occupational History    Not on file   Tobacco Use    Smoking status: Former     Types: Cigarettes     Passive exposure: Never    Smokeless tobacco: Never   Substance and Sexual Activity    Alcohol use: No    Drug use: No    Sexual activity: Not on file   Other Topics Concern    Not on file   Social History Narrative    Not on file     Social Determinants of Health     Financial Resource Strain: Low Risk  (6/24/2024)    [facility]    Financial Resource Strain     Difficulty of Paying Living Expenses: 3     Difficulty of Paying Living Expenses: Not on file   Food Insecurity: No Food Insecurity (6/24/2024)    [facility]    Food Insecurity     Worried About Running Out of Food in the Last Year: 1   Transportation Needs: No Transportation Needs (6/24/2024)    [facility]    Transportation Needs     Lack of Transportation (Medical): 1   Physical Activity: Not on file   Stress: Not on file   Social Connections: Socially Integrated (6/24/2024)    Received from [facility]    Social Connections     Frequency of Communication with Friends and Family: 0   Interpersonal Safety: Not on file   Housing Stability: Low Risk  (6/24/2024)    Received from [facility]    Housing Stability     Unable to Pay for Housing in the Last Year: 1           Medications   "Allergies   Current Outpatient Medications   Medication Sig Dispense Refill    albuterol (PROVENTIL) (2.5 MG/3ML) 0.083% neb solution Inhale 2.5 mg into the lungs every 4 hours as needed for shortness of breath      aspirin 81 MG EC tablet Take 1 tablet (81 mg) by mouth daily 30 tablet 0    atorvastatin (LIPITOR) 80 MG tablet Take 1 tablet by mouth at bedtime      diclofenac (VOLTAREN) 1 % topical gel Apply 4 g topically 4 times daily as needed for moderate pain      empagliflozin (JARDIANCE) 25 MG TABS tablet Take 25 mg by mouth at bedtime      fluticasone (FLONASE) 50 MCG/ACT nasal spray Spray 2 sprays into both nostrils daily as needed for allergies      insulin glargine (LANTUS) 100 UNIT/ML PEN Inject 33 Units Subcutaneous at bedtime      montelukast (SINGULAIR) 10 MG tablet Take 10 mg by mouth at bedtime      pioglitazone (ACTOS) 15 MG tablet Take 15 mg by mouth at bedtime      sertraline (ZOLOFT) 100 MG tablet Take 100 mg by mouth daily Take with 50 mg to = 150 mg daily      sertraline (ZOLOFT) 50 MG tablet Take 50 mg by mouth daily Take with 100 mg to = 150 mg daily      WEGOVY 1 MG/0.5ML pen Inject 1 mg Subcutaneous once a week      losartan (COZAAR) 25 MG tablet Take 1 tablet (25 mg) by mouth daily (Patient not taking: Reported on 7/2/2024) 30 tablet 0       Allergies   Allergen Reactions    Hydromorphone Itching    Metformin Diarrhea    Pcn [Penicillins] Rash    Soap Rash     \"Ivory\"          Lab Results    Chemistry/lipid CBC Cardiac Enzymes/BNP/TSH/INR   No results for input(s): \"CHOL\", \"HDL\", \"LDL\", \"TRIG\", \"CHOLHDLRATIO\" in the last 59097 hours.  No results for input(s): \"LDL\" in the last 25771 hours.  Recent Labs   Lab Test 05/16/24  1146 05/15/24  0639 05/15/24  0156   NA  --   --  140   POTASSIUM  --   --  4.1   CHLORIDE  --   --  105   CO2  --   --  24   GLC 88   < > 151*   BUN  --   --  19.6   CR  --   --  1.08*   GFRESTIMATED  --   --  57*   MARCUS  --   --  9.6    < > = values in this interval not " "displayed.     Recent Labs   Lab Test 05/15/24  0156 09/23/16  0940   CR 1.08* 0.58     Recent Labs   Lab Test 05/15/24  0156   A1C 7.8*          Recent Labs   Lab Test 05/15/24  0156   WBC 7.0   HGB 13.4   HCT 41.6   MCV 91        Recent Labs   Lab Test 05/15/24  0156 09/23/16  0940   HGB 13.4 13.1    No results for input(s): \"TROPONINI\" in the last 03541 hours.  Recent Labs   Lab Test 05/15/24  0156 09/23/16  0940   NTBNPI 160 102     No results for input(s): \"TSH\" in the last 96994 hours.  Recent Labs   Lab Test 09/23/16 0940   INR 1.09          This note has been dictated using voice recognition software. Any grammatical, typographical, or context distortions are unintentional and inherent to the software    January Perez PA-C                                       "

## 2024-07-02 ENCOUNTER — OFFICE VISIT (OUTPATIENT)
Dept: CARDIOLOGY | Facility: CLINIC | Age: 64
End: 2024-07-02
Payer: COMMERCIAL

## 2024-07-02 VITALS
WEIGHT: 272 LBS | DIASTOLIC BLOOD PRESSURE: 70 MMHG | SYSTOLIC BLOOD PRESSURE: 106 MMHG | HEART RATE: 88 BPM | RESPIRATION RATE: 16 BRPM | BODY MASS INDEX: 48.18 KG/M2

## 2024-07-02 DIAGNOSIS — I25.118 CORONARY ARTERY DISEASE INVOLVING NATIVE CORONARY ARTERY OF NATIVE HEART WITH OTHER FORM OF ANGINA PECTORIS (H): ICD-10-CM

## 2024-07-02 DIAGNOSIS — I42.9 CARDIOMYOPATHY, UNSPECIFIED TYPE (H): ICD-10-CM

## 2024-07-02 DIAGNOSIS — E66.01 MORBID OBESITY (H): ICD-10-CM

## 2024-07-02 DIAGNOSIS — R94.39 ABNORMAL CARDIOVASCULAR STRESS TEST: ICD-10-CM

## 2024-07-02 DIAGNOSIS — R07.2 PRECORDIAL PAIN: Primary | ICD-10-CM

## 2024-07-02 PROCEDURE — G2211 COMPLEX E/M VISIT ADD ON: HCPCS

## 2024-07-02 PROCEDURE — 99214 OFFICE O/P EST MOD 30 MIN: CPT

## 2024-07-02 RX ORDER — SEMAGLUTIDE 1 MG/.5ML
1 INJECTION, SOLUTION SUBCUTANEOUS WEEKLY
COMMUNITY
Start: 2024-06-17

## 2024-07-02 NOTE — LETTER
7/2/2024    Physician No Ref-Primary  No address on file    RE: Iman SHEILA Martinez       Dear Colleague,     I had the pleasure of seeing Iman Martinez in the [facility].    HEART CARE ENCOUNTER CONSULTATON NOTE      [facility]  [phone]      Assessment/Recommendations   Assessment:   Cardiomyopathy with mildly reduced ejection fraction 45-50%: Appears compensated.  Nonobstructive minimal to mild CAD on angiogram with normal LVEDP 12 mmHg.  Abnormal NM stress test suspected  due to possible breast attenuation, occasional PACs noted during test.  Bundle branch block suggested on telemetry review, although twelve-lead ECG without evidence of QRS prolongation.  - Losartan 25 mg daily  Atypical chest pain: With more strenuous exertion, but also at rest, accompanied by sensation of fast heart rates.  Nonobstructive CAD, minimal history of reflux, chest not tender to palpation, no evidence of arrhythmia thus far.  Noted increased anxiety at PCP visit 1 week ago.  Coronary artery disease: Minimal CAD, apical narrowing of LAD without obstruction LCx - Aspirin 81 mg   Hyperlipidemia, obesity: Atorvastatin 80 mg   YURI: not using CPAP  Obesity, T2DM: Insulin, Jardiance, Wegovy      Plan:   Repeat limited echocardiogram in months prior to September follow-up.  If further reduce EF or remaining mild reduction consider cardiac MRI  Perform 7-day cardiac event monitor to correlate chest pain with daily activities, rest and exertion.  Did not have arrhythmia on telemetry during hospitalization although was sedentary.  Hold atorvastatin for 2 weeks with nausea developing following hospitalization.  Determine timing of repeat lipid panel pending result of hold.  Also common Wegovy side effect, but not associated with started that medication.  Trial OTC Pepcid or Prilosec to rule out reflux involvement, although typical reflux symptoms are not consistent with current chest pain.        Follow  up 9/10/2024 with Dr. Ham     History of Present Illness/Subjective    HPI: Iman Martinez is a 64 year old female with PMHx of CAD, mildly reduced ejection fraction, presents for hospital follow-up.  Presented with chest pain atypical, although no troponin elevation.  ECG NSR without ST/T changes. Abnormal NM stress test with moderate area of ischemia in anterior apical segments of LV, stress to rest ratio abnormal 1.32.  Underwent coronary angiogram with minimal CAD, apical narrowing of LAD without obstruction LCx.  Normal BNP and LVEDP noted as of decompensation during hospitalization.    Patient reports continued intermittent chest pain symptoms.  Occur with more strenuous exertion such as walking further distances, but can also occur at rest.  Heart rate feels fast or with chest pain.  Occurred during hospitalization but was not as severe.  Not tender to palpation of chest.  His hospitalization has developed nausea.  Does have some more occasional reflux symptoms that are not similar to this chest pain.  Is uncomfortable with chest discomfort during visit today.    She started on Wegovy about 3 months ago for diabetes.        Coronary angiogram 5/15/2024  Iman Martinez is a 64 year old old female with HTN, HL, DM, who is here for w/up of abnormal stress Nuc and mild cardiomyopathy.     - non-obstructive CAD; normal L-sided filling pressures  - continue ASA 81mg daily indefinitely, atorva 80  - continue aggressive risk factor modification     Findings:  LM:no obstruction  LAD:apical narrowing, otherwise no obstruction  Lcx:mildly irregular  RCA:dominant, no obstruction     LVEDP:12     Access:  R Radial artery    NM Lexiscan stress test 5/15/2024    The nuclear stress test is abnormal.    There is a medium sized area of moderate ischemia in the entire anterior and apical segment(s) of the left ventricle. This appears to be in the left anterior descending distribution.    Stress to rest cavity ratio (TID) is  abnormal at 1.32.    The stress electrocardiogram is negative for inducible ischemic EKG changes.    The left ventricular ejection fraction at stress is greater than 70%.    The patient is at an intermediate risk of future cardiac ischemic events.    There is no prior study for comparison.     Echocardiogram 5/15/2024 Results:  1. Technically difficult study.  2. The left ventricle is normal in size. Left ventricular systolic performance  is mildly reduced. The ejection fraction is estimated to be 45-50%.  3. There is mild global reduction in left ventricular systolic performance.  4. There is trace aortic insufficiency.   5. Probable normal right ventricular size and systolic performance though  right-sided structures are not clearly visualized on all views on this study.     Physical Examination  Review of Systems   Vitals: /70 (BP Location: Left arm, Patient Position: Sitting, Cuff Size: Adult Large)   Pulse 88   Resp 16   Wt 123.4 kg (272 lb)   BMI 48.18 kg/m    BMI= Body mass index is 48.18 kg/m .  Wt Readings from Last 3 Encounters:   07/02/24 123.4 kg (272 lb)   05/15/24 122.5 kg (270 lb)   09/23/16 113.4 kg (250 lb)           ENT/Mouth: membranes moist, no oral lesions or bleeding gums.      EYES:  no scleral icterus, normal conjunctivae                    Neck: No carotid bruit or thyromegaly   Chest/Lungs:   lungs are clear to auscultation, no rales or wheezing,  equal chest wall expansion    Cardiovascular:   Regular. Normal first and second heart sounds with no murmurs, rubs, or gallops; the carotid, radial and posterior tibial pulses are intact, absent edema bilaterally        Extremities: no cyanosis or clubbing   Skin: no xanthelasma, warm.    Neurologic: no tremors     Psychiatric: alert and oriented x3, calm        Please refer above for cardiac ROS details.        Medical History  Surgical History Family History Social History   Past Medical History:   Diagnosis Date    Anxiety      Diabetes (H)      Past Surgical History:   Procedure Laterality Date    CHOLECYSTECTOMY      CV CORONARY ANGIOGRAM N/A 5/16/2024    Procedure: Coronary Angiogram;  Surgeon: Abdelrahman Allen MD;  Location: [facility] CATH LAB CV    CV LEFT HEART CATH N/A 5/16/2024    Procedure: Left Heart Catheterization;  Surgeon: Abdelrahman Allen MD;  Location: [facility] CATH LAB CV    GYN SURGERY      ORTHOPEDIC SURGERY       No family history on file.     Social History     Socioeconomic History    Marital status:      Spouse name: Not on file    Number of children: Not on file    Years of education: Not on file    Highest education level: Not on file   Occupational History    Not on file   Tobacco Use    Smoking status: Former     Types: Cigarettes     Passive exposure: Never    Smokeless tobacco: Never   Substance and Sexual Activity    Alcohol use: No    Drug use: No    Sexual activity: Not on file   Other Topics Concern    Not on file   Social History Narrative    Not on file     Social Determinants of Health     Financial Resource Strain: Low Risk  (6/24/2024)    [facility]    Financial Resource Strain     Difficulty of Paying Living Expenses: 3     Difficulty of Paying Living Expenses: Not on file   Food Insecurity: No Food Insecurity (6/24/2024)    [facility] from [facility]    Food Insecurity     Worried About Running Out of Food in the Last Year: 1   Transportation Needs: No Transportation Needs (6/24/2024)    [facility] from [facility]    Transportation Needs     Lack of Transportation (Medical): 1   Physical Activity: Not on file   Stress: Not on file   Social Connections: Socially Integrated (6/24/2024)    Received from [facility]    Social Connections     Frequency of Communication with Friends and Family: 0   Interpersonal Safety: Not on file   Housing Stability:  "Low Risk  (6/24/2024)    Received from [facility]    Housing Stability     Unable to Pay for Housing in the Last Year: 1           Medications  Allergies   Current Outpatient Medications   Medication Sig Dispense Refill    albuterol (PROVENTIL) (2.5 MG/3ML) 0.083% neb solution Inhale 2.5 mg into the lungs every 4 hours as needed for shortness of breath      aspirin 81 MG EC tablet Take 1 tablet (81 mg) by mouth daily 30 tablet 0    atorvastatin (LIPITOR) 80 MG tablet Take 1 tablet by mouth at bedtime      diclofenac (VOLTAREN) 1 % topical gel Apply 4 g topically 4 times daily as needed for moderate pain      empagliflozin (JARDIANCE) 25 MG TABS tablet Take 25 mg by mouth at bedtime      fluticasone (FLONASE) 50 MCG/ACT nasal spray Spray 2 sprays into both nostrils daily as needed for allergies      insulin glargine (LANTUS) 100 UNIT/ML PEN Inject 33 Units Subcutaneous at bedtime      montelukast (SINGULAIR) 10 MG tablet Take 10 mg by mouth at bedtime      pioglitazone (ACTOS) 15 MG tablet Take 15 mg by mouth at bedtime      sertraline (ZOLOFT) 100 MG tablet Take 100 mg by mouth daily Take with 50 mg to = 150 mg daily      sertraline (ZOLOFT) 50 MG tablet Take 50 mg by mouth daily Take with 100 mg to = 150 mg daily      WEGOVY 1 MG/0.5ML pen Inject 1 mg Subcutaneous once a week      losartan (COZAAR) 25 MG tablet Take 1 tablet (25 mg) by mouth daily (Patient not taking: Reported on 7/2/2024) 30 tablet 0       Allergies   Allergen Reactions    Hydromorphone Itching    Metformin Diarrhea    Pcn [Penicillins] Rash    Soap Rash     \"Ivory\"          Lab Results    Chemistry/lipid CBC Cardiac Enzymes/BNP/TSH/INR   No results for input(s): \"CHOL\", \"HDL\", \"LDL\", \"TRIG\", \"CHOLHDLRATIO\" in the last 30941 hours.  No results for input(s): \"LDL\" in the last 99110 hours.  Recent Labs   Lab Test 05/16/24  1146 05/15/24  0639 05/15/24  0156   NA  --   --  140   POTASSIUM  --   --  4.1   CHLORIDE  -- " "  --  105   CO2  --   --  24   GLC 88   < > 151*   BUN  --   --  19.6   CR  --   --  1.08*   GFRESTIMATED  --   --  57*   MARCUS  --   --  9.6    < > = values in this interval not displayed.     Recent Labs   Lab Test 05/15/24  0156 09/23/16  0940   CR 1.08* 0.58     Recent Labs   Lab Test 05/15/24  0156   A1C 7.8*          Recent Labs   Lab Test 05/15/24  0156   WBC 7.0   HGB 13.4   HCT 41.6   MCV 91        Recent Labs   Lab Test 05/15/24  0156 09/23/16  0940   HGB 13.4 13.1    No results for input(s): \"TROPONINI\" in the last 15440 hours.  Recent Labs   Lab Test 05/15/24  0156 09/23/16  0940   NTBNPI 160 102     No results for input(s): \"TSH\" in the last 33729 hours.  Recent Labs   Lab Test 09/23/16 0940   INR 1.09          This note has been dictated using voice recognition software. Any grammatical, typographical, or context distortions are unintentional and inherent to the software    January Perez PA-C      Thank you for allowing me to participate in the care of your patient.      Sincerely,     January Saravia PA-C     [facility]  cc:   Sid Ham MD  [address]  [place] [address]      "

## 2024-07-02 NOTE — PATIENT INSTRUCTIONS
It was a pleasure taking part in your care today:    - Schedule cardiac event monitor  - Repeat limited echocardiogram in 1 month prior to 9/10/2024 follow-up with Dr. Ham to reassess heart pumping function  - Continue aspirin and losartan  - Hold atorvastatin for 2 weeks, restart if no change in nausea or other symptoms  - Consider trial Pepcid or Prilosec for 1 month  - Nausea is possible Wegovy side effect, discuss with primary doctor if no improvement  - Follow up 9/10/2024 with Dr. Ham      Please call the [facility] clinic with any questions or concerns at [phone].     January Perez PA-C

## 2024-07-03 ENCOUNTER — HOSPITAL ENCOUNTER (OUTPATIENT)
Dept: CARDIOLOGY | Facility: HOSPITAL | Age: 64
Discharge: HOME | End: 2024-07-03
Payer: COMMERCIAL

## 2024-07-03 DIAGNOSIS — R07.2 PRECORDIAL PAIN: ICD-10-CM

## 2024-07-03 DIAGNOSIS — I42.9 CARDIOMYOPATHY, UNSPECIFIED TYPE (H): ICD-10-CM

## 2024-07-03 PROCEDURE — 93270 REMOTE 30 DAY ECG REV/REPORT: CPT

## 2024-07-11 PROCEDURE — 93272 ECG/REVIEW INTERPRET ONLY: CPT | Performed by: INTERNAL MEDICINE

## 2024-08-02 ENCOUNTER — HOSPITAL ENCOUNTER (OUTPATIENT)
Dept: CARDIOLOGY | Facility: HOSPITAL | Age: 64
Discharge: HOME | End: 2024-08-02
Payer: COMMERCIAL

## 2024-08-02 DIAGNOSIS — R07.2 PRECORDIAL PAIN: ICD-10-CM

## 2024-08-02 DIAGNOSIS — I42.9 CARDIOMYOPATHY, UNSPECIFIED TYPE (H): ICD-10-CM

## 2024-08-02 PROCEDURE — 93308 TTE F-UP OR LMTD: CPT | Mod: 26 | Performed by: INTERNAL MEDICINE

## 2024-08-02 PROCEDURE — 255N000002 HC RX 255 OP 636

## 2024-08-02 PROCEDURE — 93325 DOPPLER ECHO COLOR FLOW MAPG: CPT | Mod: 26 | Performed by: INTERNAL MEDICINE

## 2024-08-02 PROCEDURE — 93321 DOPPLER ECHO F-UP/LMTD STD: CPT | Mod: 26 | Performed by: INTERNAL MEDICINE

## 2024-08-02 RX ADMIN — PERFLUTREN 2 ML: 6.52 INJECTION, SUSPENSION INTRAVENOUS at 10:43

## 2024-08-05 DIAGNOSIS — I25.118 CORONARY ARTERY DISEASE INVOLVING NATIVE CORONARY ARTERY OF NATIVE HEART WITH OTHER FORM OF ANGINA PECTORIS (H): Primary | ICD-10-CM

## 2024-08-05 RX ORDER — ROSUVASTATIN CALCIUM 5 MG/1
5 TABLET, COATED ORAL DAILY
Qty: 90 TABLET | Refills: 3 | Status: SHIPPED | OUTPATIENT
Start: 2024-08-05

## (undated) DEVICE — SYSTEM PANNUS RETENTION 4 PAD 2 STRAP CZ-PRS-04

## (undated) DEVICE — SLEEVE TR BAND RADIAL COMPRESSION DEVICE 24CM TRB24-REG

## (undated) DEVICE — SHTH INTRO 0.021IN ID 6FR DIA

## (undated) DEVICE — ELECTRODE DEFIB CADENCE 22550R

## (undated) DEVICE — KIT HAND CONTROL ACIST 014644 AR-P54

## (undated) DEVICE — CATH DIAGNOSTIC RADIAL 5FR TIG 4.0

## (undated) DEVICE — CUSTOM PACK CORONARY SAN5BCRHEA

## (undated) DEVICE — SYR ANGIOGRAPHY MULTIUSE KIT ACIST 014612

## (undated) DEVICE — MANIFOLD KIT ANGIO AUTOMATED 014613

## (undated) RX ORDER — DIAZEPAM 5 MG
TABLET ORAL
Status: DISPENSED
Start: 2024-05-16

## (undated) RX ORDER — FENTANYL CITRATE 50 UG/ML
INJECTION, SOLUTION INTRAMUSCULAR; INTRAVENOUS
Status: DISPENSED
Start: 2024-05-16